# Patient Record
Sex: MALE | Race: ASIAN | NOT HISPANIC OR LATINO
[De-identification: names, ages, dates, MRNs, and addresses within clinical notes are randomized per-mention and may not be internally consistent; named-entity substitution may affect disease eponyms.]

---

## 2016-12-30 RX ORDER — FUROSEMIDE 40 MG
1 TABLET ORAL
Qty: 0 | Refills: 1 | DISCHARGE
Start: 2016-12-30 | End: 2017-02-27

## 2017-01-03 ENCOUNTER — LABORATORY RESULT (OUTPATIENT)
Age: 72
End: 2017-01-03

## 2017-01-03 PROBLEM — Z86.39 HISTORY OF HYPERCHOLESTEROLEMIA: Status: RESOLVED | Noted: 2017-01-03 | Resolved: 2017-01-03

## 2017-01-03 PROBLEM — Z87.891 FORMER SMOKER: Status: ACTIVE | Noted: 2017-01-03

## 2017-01-03 PROBLEM — Z86.79 HISTORY OF HYPERTENSION: Status: RESOLVED | Noted: 2017-01-03 | Resolved: 2017-01-03

## 2017-01-11 ENCOUNTER — NON-APPOINTMENT (OUTPATIENT)
Age: 72
End: 2017-01-11

## 2017-01-11 ENCOUNTER — APPOINTMENT (OUTPATIENT)
Dept: CARDIOLOGY | Facility: CLINIC | Age: 72
End: 2017-01-11

## 2017-01-11 VITALS
WEIGHT: 165 LBS | SYSTOLIC BLOOD PRESSURE: 120 MMHG | HEART RATE: 78 BPM | BODY MASS INDEX: 26.52 KG/M2 | OXYGEN SATURATION: 98 % | HEIGHT: 66 IN | DIASTOLIC BLOOD PRESSURE: 80 MMHG

## 2017-01-11 DIAGNOSIS — F32.9 MAJOR DEPRESSIVE DISORDER, SINGLE EPISODE, UNSPECIFIED: ICD-10-CM

## 2017-01-13 ENCOUNTER — LABORATORY RESULT (OUTPATIENT)
Age: 72
End: 2017-01-13

## 2017-01-13 LAB
ALBUMIN SERPL ELPH-MCNC: 4 G/DL
ALP BLD-CCNC: 115 U/L
ALT SERPL-CCNC: 26 U/L
ANION GAP SERPL CALC-SCNC: 16 MMOL/L
AST SERPL-CCNC: 21 U/L
BASOPHILS # BLD AUTO: 0.04 K/UL
BASOPHILS NFR BLD AUTO: 0.7 %
BILIRUB SERPL-MCNC: 1.5 MG/DL
BUN SERPL-MCNC: 35 MG/DL
CALCIUM SERPL-MCNC: 9.6 MG/DL
CHLORIDE SERPL-SCNC: 90 MMOL/L
CO2 SERPL-SCNC: 24 MMOL/L
CREAT SERPL-MCNC: 1.79 MG/DL
EOSINOPHIL # BLD AUTO: 0.22 K/UL
EOSINOPHIL NFR BLD AUTO: 4 %
GLUCOSE SERPL-MCNC: 115 MG/DL
HCT VFR BLD CALC: 36.5 %
HGB BLD-MCNC: 12 G/DL
IMM GRANULOCYTES NFR BLD AUTO: 0.2 %
INR PPP: 1.75 RATIO
LYMPHOCYTES # BLD AUTO: 1.22 K/UL
LYMPHOCYTES NFR BLD AUTO: 22.2 %
MAGNESIUM SERPL-MCNC: 2 MG/DL
MAN DIFF?: NORMAL
MCHC RBC-ENTMCNC: 29.5 PG
MCHC RBC-ENTMCNC: 32.9 GM/DL
MCV RBC AUTO: 89.7 FL
MONOCYTES # BLD AUTO: 0.39 K/UL
MONOCYTES NFR BLD AUTO: 7.1 %
NEUTROPHILS # BLD AUTO: 3.61 K/UL
NEUTROPHILS NFR BLD AUTO: 65.8 %
NT-PROBNP SERPL-MCNC: 3224 PG/ML
PLATELET # BLD AUTO: 321 K/UL
POTASSIUM SERPL-SCNC: 4.6 MMOL/L
PROT SERPL-MCNC: 7.8 G/DL
PT BLD: 19.9 SEC
RBC # BLD: 4.07 M/UL
RBC # FLD: 13.6 %
SODIUM SERPL-SCNC: 130 MMOL/L
WBC # FLD AUTO: 5.49 K/UL

## 2017-01-16 ENCOUNTER — LABORATORY RESULT (OUTPATIENT)
Age: 72
End: 2017-01-16

## 2017-01-17 ENCOUNTER — APPOINTMENT (OUTPATIENT)
Dept: CARDIOTHORACIC SURGERY | Facility: CLINIC | Age: 72
End: 2017-01-17

## 2017-01-17 VITALS
BODY MASS INDEX: 26.13 KG/M2 | RESPIRATION RATE: 16 BRPM | SYSTOLIC BLOOD PRESSURE: 134 MMHG | DIASTOLIC BLOOD PRESSURE: 68 MMHG | WEIGHT: 162.6 LBS | HEART RATE: 78 BPM | OXYGEN SATURATION: 99 % | HEIGHT: 66 IN

## 2017-01-19 ENCOUNTER — LABORATORY RESULT (OUTPATIENT)
Age: 72
End: 2017-01-19

## 2017-01-20 ENCOUNTER — CLINICAL ADVICE (OUTPATIENT)
Age: 72
End: 2017-01-20

## 2017-01-23 ENCOUNTER — LABORATORY RESULT (OUTPATIENT)
Age: 72
End: 2017-01-23

## 2017-01-24 ENCOUNTER — LABORATORY RESULT (OUTPATIENT)
Age: 72
End: 2017-01-24

## 2017-01-24 ENCOUNTER — APPOINTMENT (OUTPATIENT)
Dept: CARDIOLOGY | Facility: CLINIC | Age: 72
End: 2017-01-24

## 2017-01-25 ENCOUNTER — NON-APPOINTMENT (OUTPATIENT)
Age: 72
End: 2017-01-25

## 2017-01-25 ENCOUNTER — APPOINTMENT (OUTPATIENT)
Dept: CARDIOLOGY | Facility: CLINIC | Age: 72
End: 2017-01-25

## 2017-01-25 VITALS
WEIGHT: 164 LBS | OXYGEN SATURATION: 98 % | HEART RATE: 79 BPM | BODY MASS INDEX: 26.36 KG/M2 | SYSTOLIC BLOOD PRESSURE: 112 MMHG | HEIGHT: 66 IN | DIASTOLIC BLOOD PRESSURE: 72 MMHG

## 2017-01-25 DIAGNOSIS — K21.9 GASTRO-ESOPHAGEAL REFLUX DISEASE W/OUT ESOPHAGITIS: ICD-10-CM

## 2017-01-25 DIAGNOSIS — T81.30XA DISRUPTION OF WOUND, UNSPECIFIED, INITIAL ENCOUNTER: ICD-10-CM

## 2017-01-31 ENCOUNTER — LABORATORY RESULT (OUTPATIENT)
Age: 72
End: 2017-01-31

## 2017-02-02 ENCOUNTER — MEDICATION RENEWAL (OUTPATIENT)
Age: 72
End: 2017-02-02

## 2017-02-21 ENCOUNTER — APPOINTMENT (OUTPATIENT)
Dept: CARDIOLOGY | Facility: CLINIC | Age: 72
End: 2017-02-21

## 2017-02-21 ENCOUNTER — NON-APPOINTMENT (OUTPATIENT)
Age: 72
End: 2017-02-21

## 2017-02-21 VITALS
HEIGHT: 66 IN | DIASTOLIC BLOOD PRESSURE: 83 MMHG | WEIGHT: 165 LBS | HEART RATE: 54 BPM | SYSTOLIC BLOOD PRESSURE: 130 MMHG | OXYGEN SATURATION: 98 % | BODY MASS INDEX: 26.52 KG/M2

## 2017-02-22 LAB
ANION GAP SERPL CALC-SCNC: 11 MMOL/L
BUN SERPL-MCNC: 21 MG/DL
CALCIUM SERPL-MCNC: 9.9 MG/DL
CHLORIDE SERPL-SCNC: 103 MMOL/L
CO2 SERPL-SCNC: 23 MMOL/L
CREAT SERPL-MCNC: 1.18 MG/DL
GLUCOSE SERPL-MCNC: 195 MG/DL
INR PPP: 0.97 RATIO
NT-PROBNP SERPL-MCNC: 3861 PG/ML
POTASSIUM SERPL-SCNC: 5.1 MMOL/L
PT BLD: 11 SEC
SODIUM SERPL-SCNC: 137 MMOL/L

## 2017-03-21 ENCOUNTER — APPOINTMENT (OUTPATIENT)
Dept: CARDIOLOGY | Facility: CLINIC | Age: 72
End: 2017-03-21

## 2017-03-21 ENCOUNTER — NON-APPOINTMENT (OUTPATIENT)
Age: 72
End: 2017-03-21

## 2017-03-21 VITALS — WEIGHT: 166.5 LBS | BODY MASS INDEX: 26.87 KG/M2

## 2017-03-21 VITALS
HEART RATE: 77 BPM | BODY MASS INDEX: 27.6 KG/M2 | SYSTOLIC BLOOD PRESSURE: 137 MMHG | OXYGEN SATURATION: 98 % | DIASTOLIC BLOOD PRESSURE: 89 MMHG | WEIGHT: 171 LBS

## 2017-03-28 ENCOUNTER — LABORATORY RESULT (OUTPATIENT)
Age: 72
End: 2017-03-28

## 2017-04-05 ENCOUNTER — CHART COPY (OUTPATIENT)
Age: 72
End: 2017-04-05

## 2017-04-11 ENCOUNTER — APPOINTMENT (OUTPATIENT)
Dept: CARDIOLOGY | Facility: CLINIC | Age: 72
End: 2017-04-11

## 2017-04-18 ENCOUNTER — APPOINTMENT (OUTPATIENT)
Dept: CARDIOLOGY | Facility: CLINIC | Age: 72
End: 2017-04-18

## 2017-04-25 ENCOUNTER — APPOINTMENT (OUTPATIENT)
Dept: CARDIOLOGY | Facility: CLINIC | Age: 72
End: 2017-04-25

## 2017-04-25 VITALS — DIASTOLIC BLOOD PRESSURE: 75 MMHG | OXYGEN SATURATION: 98 % | SYSTOLIC BLOOD PRESSURE: 127 MMHG | HEART RATE: 86 BPM

## 2017-04-25 RX ORDER — LISINOPRIL 5 MG/1
5 TABLET ORAL DAILY
Qty: 30 | Refills: 0 | Status: DISCONTINUED | COMMUNITY
Start: 2017-01-25 | End: 2017-04-25

## 2017-04-25 RX ORDER — LISINOPRIL 20 MG/1
20 TABLET ORAL DAILY
Qty: 30 | Refills: 0 | Status: DISCONTINUED | COMMUNITY
Start: 2017-01-25 | End: 2017-04-25

## 2017-04-26 LAB
ANION GAP SERPL CALC-SCNC: 13 MMOL/L
BUN SERPL-MCNC: 25 MG/DL
CALCIUM SERPL-MCNC: 9.1 MG/DL
CHLORIDE SERPL-SCNC: 103 MMOL/L
CO2 SERPL-SCNC: 24 MMOL/L
CREAT SERPL-MCNC: 1.25 MG/DL
GLUCOSE SERPL-MCNC: 153 MG/DL
POTASSIUM SERPL-SCNC: 4 MMOL/L
SODIUM SERPL-SCNC: 140 MMOL/L

## 2017-05-16 ENCOUNTER — LABORATORY RESULT (OUTPATIENT)
Age: 72
End: 2017-05-16

## 2017-05-17 ENCOUNTER — APPOINTMENT (OUTPATIENT)
Dept: ELECTROPHYSIOLOGY | Facility: CLINIC | Age: 72
End: 2017-05-17

## 2017-05-23 ENCOUNTER — APPOINTMENT (OUTPATIENT)
Dept: CARDIOLOGY | Facility: CLINIC | Age: 72
End: 2017-05-23

## 2017-05-30 ENCOUNTER — RESULT CHARGE (OUTPATIENT)
Age: 72
End: 2017-05-30

## 2017-06-01 ENCOUNTER — LABORATORY RESULT (OUTPATIENT)
Age: 72
End: 2017-06-01

## 2017-06-07 ENCOUNTER — APPOINTMENT (OUTPATIENT)
Dept: ELECTROPHYSIOLOGY | Facility: CLINIC | Age: 72
End: 2017-06-07

## 2017-12-15 ENCOUNTER — NON-APPOINTMENT (OUTPATIENT)
Age: 72
End: 2017-12-15

## 2017-12-15 ENCOUNTER — APPOINTMENT (OUTPATIENT)
Dept: CARDIOLOGY | Facility: CLINIC | Age: 72
End: 2017-12-15
Payer: MEDICARE

## 2017-12-15 VITALS
SYSTOLIC BLOOD PRESSURE: 132 MMHG | OXYGEN SATURATION: 98 % | BODY MASS INDEX: 29.57 KG/M2 | DIASTOLIC BLOOD PRESSURE: 80 MMHG | WEIGHT: 183.2 LBS | HEART RATE: 88 BPM

## 2017-12-15 DIAGNOSIS — R01.2 OTHER CARDIAC SOUNDS: ICD-10-CM

## 2017-12-15 PROCEDURE — 93000 ELECTROCARDIOGRAM COMPLETE: CPT

## 2017-12-15 PROCEDURE — 99215 OFFICE O/P EST HI 40 MIN: CPT

## 2017-12-27 LAB
ALBUMIN SERPL ELPH-MCNC: 3.7 G/DL
ALP BLD-CCNC: 92 U/L
ALT SERPL-CCNC: 18 U/L
ANION GAP SERPL CALC-SCNC: 14 MMOL/L
AST SERPL-CCNC: 20 U/L
BASOPHILS # BLD AUTO: 0.03 K/UL
BASOPHILS NFR BLD AUTO: 0.6 %
BILIRUB SERPL-MCNC: 1.3 MG/DL
BUN SERPL-MCNC: 27 MG/DL
CALCIUM SERPL-MCNC: 9.6 MG/DL
CHLORIDE SERPL-SCNC: 96 MMOL/L
CO2 SERPL-SCNC: 28 MMOL/L
CREAT SERPL-MCNC: 1.47 MG/DL
EOSINOPHIL # BLD AUTO: 0.33 K/UL
EOSINOPHIL NFR BLD AUTO: 6.8 %
GLUCOSE SERPL-MCNC: 215 MG/DL
HCT VFR BLD CALC: 43.9 %
HGB BLD-MCNC: 14.1 G/DL
IMM GRANULOCYTES NFR BLD AUTO: 0.2 %
LYMPHOCYTES # BLD AUTO: 1.41 K/UL
LYMPHOCYTES NFR BLD AUTO: 28.9 %
MAGNESIUM SERPL-MCNC: 1.7 MG/DL
MAN DIFF?: NORMAL
MCHC RBC-ENTMCNC: 29.6 PG
MCHC RBC-ENTMCNC: 32.1 GM/DL
MCV RBC AUTO: 92 FL
MONOCYTES # BLD AUTO: 0.35 K/UL
MONOCYTES NFR BLD AUTO: 7.2 %
NEUTROPHILS # BLD AUTO: 2.75 K/UL
NEUTROPHILS NFR BLD AUTO: 56.3 %
NT-PROBNP SERPL-MCNC: 2155 PG/ML
PLATELET # BLD AUTO: 164 K/UL
POTASSIUM SERPL-SCNC: 3.4 MMOL/L
PROT SERPL-MCNC: 7.1 G/DL
RBC # BLD: 4.77 M/UL
RBC # FLD: 15.4 %
SODIUM SERPL-SCNC: 138 MMOL/L
WBC # FLD AUTO: 4.88 K/UL

## 2018-01-23 ENCOUNTER — APPOINTMENT (OUTPATIENT)
Dept: CARDIOLOGY | Facility: CLINIC | Age: 73
End: 2018-01-23
Payer: MEDICARE

## 2018-01-23 VITALS
OXYGEN SATURATION: 99 % | HEART RATE: 76 BPM | HEIGHT: 66 IN | WEIGHT: 169 LBS | BODY MASS INDEX: 27.16 KG/M2 | DIASTOLIC BLOOD PRESSURE: 72 MMHG | SYSTOLIC BLOOD PRESSURE: 129 MMHG

## 2018-01-23 DIAGNOSIS — E87.5 HYPERKALEMIA: ICD-10-CM

## 2018-01-23 PROCEDURE — 99215 OFFICE O/P EST HI 40 MIN: CPT

## 2018-01-27 ENCOUNTER — MEDICATION RENEWAL (OUTPATIENT)
Age: 73
End: 2018-01-27

## 2018-02-19 ENCOUNTER — TRANSCRIPTION ENCOUNTER (OUTPATIENT)
Age: 73
End: 2018-02-19

## 2018-02-22 ENCOUNTER — RX RENEWAL (OUTPATIENT)
Age: 73
End: 2018-02-22

## 2018-02-27 ENCOUNTER — RX RENEWAL (OUTPATIENT)
Age: 73
End: 2018-02-27

## 2018-03-15 ENCOUNTER — LABORATORY RESULT (OUTPATIENT)
Age: 73
End: 2018-03-15

## 2018-03-26 ENCOUNTER — RX RENEWAL (OUTPATIENT)
Age: 73
End: 2018-03-26

## 2018-03-27 ENCOUNTER — RX RENEWAL (OUTPATIENT)
Age: 73
End: 2018-03-27

## 2018-03-27 ENCOUNTER — NON-APPOINTMENT (OUTPATIENT)
Age: 73
End: 2018-03-27

## 2018-03-27 ENCOUNTER — APPOINTMENT (OUTPATIENT)
Dept: VASCULAR SURGERY | Facility: CLINIC | Age: 73
End: 2018-03-27
Payer: MEDICARE

## 2018-03-27 ENCOUNTER — APPOINTMENT (OUTPATIENT)
Dept: CARDIOLOGY | Facility: CLINIC | Age: 73
End: 2018-03-27
Payer: MEDICARE

## 2018-03-27 VITALS
RESPIRATION RATE: 16 BRPM | OXYGEN SATURATION: 99 % | HEART RATE: 67 BPM | DIASTOLIC BLOOD PRESSURE: 78 MMHG | HEIGHT: 66 IN | SYSTOLIC BLOOD PRESSURE: 116 MMHG | TEMPERATURE: 97.7 F | BODY MASS INDEX: 28.77 KG/M2 | WEIGHT: 179.03 LBS

## 2018-03-27 VITALS — OXYGEN SATURATION: 100 % | WEIGHT: 178 LBS | HEART RATE: 67 BPM | BODY MASS INDEX: 28.73 KG/M2

## 2018-03-27 VITALS — DIASTOLIC BLOOD PRESSURE: 82 MMHG | SYSTOLIC BLOOD PRESSURE: 128 MMHG

## 2018-03-27 DIAGNOSIS — Z82.49 FAMILY HISTORY OF ISCHEMIC HEART DISEASE AND OTHER DISEASES OF THE CIRCULATORY SYSTEM: ICD-10-CM

## 2018-03-27 PROCEDURE — 93000 ELECTROCARDIOGRAM COMPLETE: CPT

## 2018-03-27 PROCEDURE — 93970 EXTREMITY STUDY: CPT

## 2018-03-27 PROCEDURE — 99215 OFFICE O/P EST HI 40 MIN: CPT

## 2018-03-27 PROCEDURE — 99204 OFFICE O/P NEW MOD 45 MIN: CPT

## 2018-03-28 ENCOUNTER — MEDICATION RENEWAL (OUTPATIENT)
Age: 73
End: 2018-03-28

## 2018-03-30 PROBLEM — Z82.49 FAMILY HISTORY OF HYPERTENSION: Status: ACTIVE | Noted: 2018-03-27

## 2018-03-30 PROBLEM — Z82.49 FAMILY HISTORY OF CARDIAC DISORDER: Status: ACTIVE | Noted: 2018-03-27

## 2018-03-31 ENCOUNTER — RX RENEWAL (OUTPATIENT)
Age: 73
End: 2018-03-31

## 2018-05-01 ENCOUNTER — APPOINTMENT (OUTPATIENT)
Dept: VASCULAR SURGERY | Facility: CLINIC | Age: 73
End: 2018-05-01

## 2018-05-02 ENCOUNTER — APPOINTMENT (OUTPATIENT)
Dept: VASCULAR SURGERY | Facility: CLINIC | Age: 73
End: 2018-05-02
Payer: MEDICARE

## 2018-05-02 DIAGNOSIS — M79.89 OTHER SPECIFIED SOFT TISSUE DISORDERS: ICD-10-CM

## 2018-05-02 PROCEDURE — 36475 ENDOVENOUS RF 1ST VEIN: CPT | Mod: LT

## 2018-05-04 ENCOUNTER — APPOINTMENT (OUTPATIENT)
Dept: VASCULAR SURGERY | Facility: CLINIC | Age: 73
End: 2018-05-04
Payer: MEDICARE

## 2018-05-04 PROCEDURE — 93971 EXTREMITY STUDY: CPT

## 2018-05-06 ENCOUNTER — RX RENEWAL (OUTPATIENT)
Age: 73
End: 2018-05-06

## 2018-05-07 ENCOUNTER — MEDICATION RENEWAL (OUTPATIENT)
Age: 73
End: 2018-05-07

## 2018-05-15 ENCOUNTER — APPOINTMENT (OUTPATIENT)
Dept: VASCULAR SURGERY | Facility: CLINIC | Age: 73
End: 2018-05-15
Payer: MEDICARE

## 2018-05-15 VITALS
WEIGHT: 178 LBS | RESPIRATION RATE: 16 BRPM | BODY MASS INDEX: 28.61 KG/M2 | DIASTOLIC BLOOD PRESSURE: 69 MMHG | HEART RATE: 68 BPM | SYSTOLIC BLOOD PRESSURE: 128 MMHG | OXYGEN SATURATION: 99 % | TEMPERATURE: 97.9 F | HEIGHT: 66 IN

## 2018-05-15 DIAGNOSIS — Z78.9 OTHER SPECIFIED HEALTH STATUS: ICD-10-CM

## 2018-05-15 DIAGNOSIS — M79.605 PAIN IN LEFT LEG: ICD-10-CM

## 2018-05-15 DIAGNOSIS — Z98.890 OTHER SPECIFIED POSTPROCEDURAL STATES: ICD-10-CM

## 2018-05-15 PROCEDURE — 99214 OFFICE O/P EST MOD 30 MIN: CPT

## 2018-05-23 ENCOUNTER — APPOINTMENT (OUTPATIENT)
Dept: CARDIOLOGY | Facility: CLINIC | Age: 73
End: 2018-05-23
Payer: MEDICARE

## 2018-05-23 ENCOUNTER — NON-APPOINTMENT (OUTPATIENT)
Age: 73
End: 2018-05-23

## 2018-05-23 VITALS
HEART RATE: 64 BPM | HEIGHT: 66 IN | DIASTOLIC BLOOD PRESSURE: 77 MMHG | OXYGEN SATURATION: 98 % | SYSTOLIC BLOOD PRESSURE: 119 MMHG | WEIGHT: 176 LBS | BODY MASS INDEX: 28.28 KG/M2

## 2018-05-23 DIAGNOSIS — R73.9 HYPERGLYCEMIA, UNSPECIFIED: ICD-10-CM

## 2018-05-23 PROCEDURE — 93000 ELECTROCARDIOGRAM COMPLETE: CPT

## 2018-05-23 PROCEDURE — 99215 OFFICE O/P EST HI 40 MIN: CPT

## 2018-05-23 RX ORDER — ESCITALOPRAM OXALATE 10 MG/1
10 TABLET ORAL DAILY
Qty: 30 | Refills: 0 | Status: DISCONTINUED | COMMUNITY
Start: 2018-01-23 | End: 2018-05-23

## 2018-05-23 RX ORDER — METOLAZONE 5 MG/1
5 TABLET ORAL DAILY
Qty: 30 | Refills: 0 | Status: DISCONTINUED | COMMUNITY
Start: 2017-12-15 | End: 2018-05-23

## 2018-05-23 RX ORDER — METFORMIN HYDROCHLORIDE 500 MG/1
500 TABLET, COATED ORAL
Qty: 60 | Refills: 0 | Status: DISCONTINUED | COMMUNITY
Start: 2016-12-30 | End: 2018-05-23

## 2018-05-29 PROBLEM — Z98.890 STATUS POST ENDOVENOUS RADIOFREQUENCY ABLATION (RFA) OF SAPHENOUS VEIN: Status: ACTIVE | Noted: 2018-05-04

## 2018-05-29 PROBLEM — M79.605 LEFT LEG PAIN: Status: ACTIVE | Noted: 2018-03-27

## 2018-05-29 PROBLEM — Z78.9 NON-SMOKER: Status: ACTIVE | Noted: 2018-03-27

## 2018-06-12 ENCOUNTER — APPOINTMENT (OUTPATIENT)
Dept: CARDIOLOGY | Facility: CLINIC | Age: 73
End: 2018-06-12

## 2018-06-18 ENCOUNTER — RX RENEWAL (OUTPATIENT)
Age: 73
End: 2018-06-18

## 2018-06-27 ENCOUNTER — MEDICATION RENEWAL (OUTPATIENT)
Age: 73
End: 2018-06-27

## 2018-07-02 ENCOUNTER — APPOINTMENT (OUTPATIENT)
Dept: CARDIOLOGY | Facility: CLINIC | Age: 73
End: 2018-07-02

## 2018-07-11 ENCOUNTER — APPOINTMENT (OUTPATIENT)
Dept: CARDIOLOGY | Facility: CLINIC | Age: 73
End: 2018-07-11

## 2018-07-16 ENCOUNTER — APPOINTMENT (OUTPATIENT)
Dept: CARDIOLOGY | Facility: CLINIC | Age: 73
End: 2018-07-16
Payer: MEDICARE

## 2018-07-16 PROCEDURE — 93306 TTE W/DOPPLER COMPLETE: CPT

## 2018-07-16 PROCEDURE — 76376 3D RENDER W/INTRP POSTPROCES: CPT

## 2018-07-16 PROCEDURE — 0399T: CPT

## 2018-07-18 ENCOUNTER — APPOINTMENT (OUTPATIENT)
Dept: CARDIOLOGY | Facility: CLINIC | Age: 73
End: 2018-07-18
Payer: MEDICARE

## 2018-07-18 VITALS
BODY MASS INDEX: 29.89 KG/M2 | HEART RATE: 76 BPM | OXYGEN SATURATION: 99 % | WEIGHT: 186 LBS | DIASTOLIC BLOOD PRESSURE: 86 MMHG | SYSTOLIC BLOOD PRESSURE: 157 MMHG | HEIGHT: 66 IN

## 2018-07-18 VITALS — DIASTOLIC BLOOD PRESSURE: 90 MMHG | SYSTOLIC BLOOD PRESSURE: 155 MMHG

## 2018-07-18 DIAGNOSIS — I44.7 LEFT BUNDLE-BRANCH BLOCK, UNSPECIFIED: ICD-10-CM

## 2018-07-18 DIAGNOSIS — I42.0 DILATED CARDIOMYOPATHY: ICD-10-CM

## 2018-07-18 PROCEDURE — 99215 OFFICE O/P EST HI 40 MIN: CPT

## 2018-08-09 ENCOUNTER — MEDICATION RENEWAL (OUTPATIENT)
Age: 73
End: 2018-08-09

## 2018-08-27 ENCOUNTER — APPOINTMENT (OUTPATIENT)
Dept: ELECTROPHYSIOLOGY | Facility: CLINIC | Age: 73
End: 2018-08-27

## 2018-08-29 ENCOUNTER — APPOINTMENT (OUTPATIENT)
Dept: CARDIOLOGY | Facility: CLINIC | Age: 73
End: 2018-08-29

## 2018-10-16 ENCOUNTER — APPOINTMENT (OUTPATIENT)
Dept: CARDIOLOGY | Facility: CLINIC | Age: 73
End: 2018-10-16
Payer: MEDICARE

## 2018-10-16 ENCOUNTER — NON-APPOINTMENT (OUTPATIENT)
Age: 73
End: 2018-10-16

## 2018-10-16 VITALS
BODY MASS INDEX: 30.22 KG/M2 | OXYGEN SATURATION: 99 % | DIASTOLIC BLOOD PRESSURE: 73 MMHG | SYSTOLIC BLOOD PRESSURE: 112 MMHG | WEIGHT: 188 LBS | HEIGHT: 66 IN | HEART RATE: 76 BPM

## 2018-10-16 DIAGNOSIS — I34.0 NONRHEUMATIC MITRAL (VALVE) INSUFFICIENCY: ICD-10-CM

## 2018-10-16 PROCEDURE — 99215 OFFICE O/P EST HI 40 MIN: CPT

## 2018-10-16 PROCEDURE — 93000 ELECTROCARDIOGRAM COMPLETE: CPT

## 2018-10-26 ENCOUNTER — APPOINTMENT (OUTPATIENT)
Dept: ELECTROPHYSIOLOGY | Facility: CLINIC | Age: 73
End: 2018-10-26

## 2018-10-29 ENCOUNTER — APPOINTMENT (OUTPATIENT)
Dept: CARDIOLOGY | Facility: CLINIC | Age: 73
End: 2018-10-29
Payer: MEDICARE

## 2018-10-29 PROCEDURE — 93321 DOPPLER ECHO F-UP/LMTD STD: CPT | Mod: 59

## 2018-10-29 PROCEDURE — 93308 TTE F-UP OR LMTD: CPT

## 2018-10-29 PROCEDURE — 76376 3D RENDER W/INTRP POSTPROCES: CPT

## 2018-10-29 PROCEDURE — 0399T: CPT

## 2018-10-29 PROCEDURE — 93325 DOPPLER ECHO COLOR FLOW MAPG: CPT

## 2018-10-30 ENCOUNTER — CLINICAL ADVICE (OUTPATIENT)
Age: 73
End: 2018-10-30

## 2018-11-02 ENCOUNTER — OUTPATIENT (OUTPATIENT)
Dept: OUTPATIENT SERVICES | Facility: HOSPITAL | Age: 73
LOS: 1 days | End: 2018-11-02
Payer: MEDICARE

## 2018-11-02 VITALS
OXYGEN SATURATION: 98 % | WEIGHT: 188.94 LBS | TEMPERATURE: 97 F | DIASTOLIC BLOOD PRESSURE: 78 MMHG | SYSTOLIC BLOOD PRESSURE: 125 MMHG | RESPIRATION RATE: 18 BRPM | HEIGHT: 66 IN | HEART RATE: 67 BPM

## 2018-11-02 VITALS — WEIGHT: 188.94 LBS | HEIGHT: 66 IN

## 2018-11-02 DIAGNOSIS — I50.9 HEART FAILURE, UNSPECIFIED: Chronic | ICD-10-CM

## 2018-11-02 DIAGNOSIS — I25.10 ATHEROSCLEROTIC HEART DISEASE OF NATIVE CORONARY ARTERY WITHOUT ANGINA PECTORIS: Chronic | ICD-10-CM

## 2018-11-02 DIAGNOSIS — E78.5 HYPERLIPIDEMIA, UNSPECIFIED: Chronic | ICD-10-CM

## 2018-11-02 DIAGNOSIS — I48.91 UNSPECIFIED ATRIAL FIBRILLATION: Chronic | ICD-10-CM

## 2018-11-02 DIAGNOSIS — I10 ESSENTIAL (PRIMARY) HYPERTENSION: Chronic | ICD-10-CM

## 2018-11-02 DIAGNOSIS — Z01.810 ENCOUNTER FOR PREPROCEDURAL CARDIOVASCULAR EXAMINATION: ICD-10-CM

## 2018-11-02 DIAGNOSIS — I34.0 NONRHEUMATIC MITRAL (VALVE) INSUFFICIENCY: Chronic | ICD-10-CM

## 2018-11-02 DIAGNOSIS — Z95.1 PRESENCE OF AORTOCORONARY BYPASS GRAFT: Chronic | ICD-10-CM

## 2018-11-02 DIAGNOSIS — I25.5 ISCHEMIC CARDIOMYOPATHY: Chronic | ICD-10-CM

## 2018-11-02 DIAGNOSIS — Z98.890 OTHER SPECIFIED POSTPROCEDURAL STATES: Chronic | ICD-10-CM

## 2018-11-02 DIAGNOSIS — K21.9 GASTRO-ESOPHAGEAL REFLUX DISEASE WITHOUT ESOPHAGITIS: Chronic | ICD-10-CM

## 2018-11-02 DIAGNOSIS — I44.7 LEFT BUNDLE-BRANCH BLOCK, UNSPECIFIED: Chronic | ICD-10-CM

## 2018-11-02 DIAGNOSIS — E11.9 TYPE 2 DIABETES MELLITUS WITHOUT COMPLICATIONS: Chronic | ICD-10-CM

## 2018-11-02 LAB
ANION GAP SERPL CALC-SCNC: 11 MMOL/L — SIGNIFICANT CHANGE UP (ref 5–17)
APTT BLD: 32 SEC — SIGNIFICANT CHANGE UP (ref 27.5–36.3)
BASOPHILS # BLD AUTO: 0 K/UL — SIGNIFICANT CHANGE UP (ref 0–0.2)
BASOPHILS NFR BLD AUTO: 0.6 % — SIGNIFICANT CHANGE UP (ref 0–2)
BUN SERPL-MCNC: 34 MG/DL — HIGH (ref 8–20)
CALCIUM SERPL-MCNC: 8.8 MG/DL — SIGNIFICANT CHANGE UP (ref 8.6–10.2)
CHLORIDE SERPL-SCNC: 103 MMOL/L — SIGNIFICANT CHANGE UP (ref 98–107)
CO2 SERPL-SCNC: 22 MMOL/L — SIGNIFICANT CHANGE UP (ref 22–29)
CREAT SERPL-MCNC: 1.39 MG/DL — HIGH (ref 0.5–1.3)
EOSINOPHIL # BLD AUTO: 0.3 K/UL — SIGNIFICANT CHANGE UP (ref 0–0.5)
EOSINOPHIL NFR BLD AUTO: 6.4 % — HIGH (ref 0–5)
GLUCOSE SERPL-MCNC: 164 MG/DL — HIGH (ref 70–115)
HCT VFR BLD CALC: 36.9 % — LOW (ref 42–52)
HGB BLD-MCNC: 12.1 G/DL — LOW (ref 14–18)
INR BLD: 1.01 RATIO — SIGNIFICANT CHANGE UP (ref 0.88–1.16)
LYMPHOCYTES # BLD AUTO: 1.4 K/UL — SIGNIFICANT CHANGE UP (ref 1–4.8)
LYMPHOCYTES # BLD AUTO: 30.8 % — SIGNIFICANT CHANGE UP (ref 20–55)
MAGNESIUM SERPL-MCNC: 2 MG/DL — SIGNIFICANT CHANGE UP (ref 1.6–2.6)
MCHC RBC-ENTMCNC: 31.6 PG — HIGH (ref 27–31)
MCHC RBC-ENTMCNC: 32.8 G/DL — SIGNIFICANT CHANGE UP (ref 32–36)
MCV RBC AUTO: 96.3 FL — HIGH (ref 80–94)
MONOCYTES # BLD AUTO: 0.2 K/UL — SIGNIFICANT CHANGE UP (ref 0–0.8)
MONOCYTES NFR BLD AUTO: 5.3 % — SIGNIFICANT CHANGE UP (ref 3–10)
NEUTROPHILS # BLD AUTO: 2.7 K/UL — SIGNIFICANT CHANGE UP (ref 1.8–8)
NEUTROPHILS NFR BLD AUTO: 56.5 % — SIGNIFICANT CHANGE UP (ref 37–73)
PLATELET # BLD AUTO: 156 K/UL — SIGNIFICANT CHANGE UP (ref 150–400)
POTASSIUM SERPL-MCNC: 4.4 MMOL/L — SIGNIFICANT CHANGE UP (ref 3.5–5.3)
POTASSIUM SERPL-SCNC: 4.4 MMOL/L — SIGNIFICANT CHANGE UP (ref 3.5–5.3)
PROTHROM AB SERPL-ACNC: 11.6 SEC — SIGNIFICANT CHANGE UP (ref 10–12.9)
RBC # BLD: 3.83 M/UL — LOW (ref 4.6–6.2)
RBC # FLD: 13 % — SIGNIFICANT CHANGE UP (ref 11–15.6)
SODIUM SERPL-SCNC: 136 MMOL/L — SIGNIFICANT CHANGE UP (ref 135–145)
WBC # BLD: 4.7 K/UL — LOW (ref 4.8–10.8)
WBC # FLD AUTO: 4.7 K/UL — LOW (ref 4.8–10.8)

## 2018-11-02 PROCEDURE — 80048 BASIC METABOLIC PNL TOTAL CA: CPT

## 2018-11-02 PROCEDURE — 85610 PROTHROMBIN TIME: CPT

## 2018-11-02 PROCEDURE — 85730 THROMBOPLASTIN TIME PARTIAL: CPT

## 2018-11-02 PROCEDURE — 85027 COMPLETE CBC AUTOMATED: CPT

## 2018-11-02 PROCEDURE — 93005 ELECTROCARDIOGRAM TRACING: CPT

## 2018-11-02 PROCEDURE — 93010 ELECTROCARDIOGRAM REPORT: CPT

## 2018-11-02 PROCEDURE — G0463: CPT

## 2018-11-02 PROCEDURE — 83735 ASSAY OF MAGNESIUM: CPT

## 2018-11-02 PROCEDURE — 36415 COLL VENOUS BLD VENIPUNCTURE: CPT

## 2018-11-02 RX ORDER — APIXABAN 2.5 MG/1
1 TABLET, FILM COATED ORAL
Qty: 0 | Refills: 0 | COMMUNITY

## 2018-11-02 NOTE — ASU PATIENT PROFILE, ADULT - PMH
AF (atrial fibrillation)    CAD (coronary artery disease)    Cardiomyopathy    CHF (congestive heart failure)  chronic systolic congestive heart failure  DM (diabetes mellitus)    Ejection fraction < 50%  as per  echo  10-29-18   ef<20%  HTN (hypertension)    LBBB (left bundle branch block)    MR (mitral regurgitation)

## 2018-11-02 NOTE — H&P PST ADULT - ASSESSMENT
This is a 72 yo Slov[place] male with PMHx of HTN, DM, CAD sp 3VCABG, Mitral valve repair, MARY LOU clip, ICM EF 20%, NYHA class 3 HF, and newly diagnosed Afib.  Patient is sp life vest.  Plan for this patient is RANDOLPH to evaluate leak and plan for CRT-D. Patient was started on Eliquis 10/16/2018.    Plan RANDOLPH with possible CV 11/7/2018  Patient to be npo p mn 11/6/2018  Eliquis 5 mg BID D/W Dr Juve liu procedure  Hold Metformin and glimipiride 11/7/2018 am This is a 72 yo Slov[place] male with PMHx of HTN, DM, CAD sp 3VCABG, Mitral valve repair, MARY LOU clip, ICM EF 20%, NYHA class 3 HF, and newly diagnosed Afib.  Patient is sp life vest.  Plan for this patient is RANDOLPH to evaluate leak and plan for CRT-D. Patient was started on Eliquis 10/16/2018.    GFR 50    Plan RANDOLPH with possible CV 11/7/2018  Patient to be npo p mn 11/6/2018  Eliquis 5 mg BID D/W Dr Juve liu procedure  Hold Metformin and glimipiride 11/7/2018 am

## 2018-11-02 NOTE — H&P PST ADULT - NEGATIVE NEUROLOGICAL SYMPTOMS
no syncope/no focal seizures/no vertigo/no loss of sensation/no transient paralysis/no difficulty walking/no weakness/no paresthesias/no generalized seizures

## 2018-11-02 NOTE — H&P PST ADULT - HISTORY OF PRESENT ILLNESS
This is a 70 yo Maced[place] male with PMHx of HTN, DM, CAD sp 3VCABG, Mitral valve repair, MARY LOU clip, ICM EF 20%, NYHA class 3 HF, and newly diagnosed Afib.  Patient is sp life vest.  Plan for this patient is RANDOLPH to evaluate leak and plan for CRT-D. Patient was started on Eliquis 10/16/2018.    Echo 10/29/2018: Moderately enlarged LA, Segmental wall motion abnormalities in multivessel distribution - worse in RCA territory.  LVEF 16%, mildly dilated RA, RV sixe is mildly enlarged and function is mildly reduced, mild aortic regurgitation, S/P mitral annular ring , mild mitral valve regurgitation.      Cardiac Catheterization 12/14/2018:  < from: Cardiac Cath Lab - Adult (12.14.16 @ 16:48) >  VENTRICLES: No LV gram was performed; however, a recent echocardiogram  demonstrated an EF of 20 %.  CORONARY VESSELS: The coronary circulation is right dominant.  LM:   --  LM: Angiography showed minor luminal irregularities with no flow  limiting lesions.  LAD:   --  LAD: There was a 80 % stenosis in the proximal third of the  vessel segment.  --  Mid LAD: There was a 80 % stenosis.  --  Distal LAD: There was a 70 % stenosis.  CX:   --  Circumflex: Angiography showed mild atherosclerosis with no flow  limiting lesions.  --  OM1: There was a 70 % stenosis in the middle third of the vessel  segment.  RI:   --  Ramus intermedius: There was a 90 % stenosis in the middle third  of the vessel segment.  RCA:   --  Distal RCA: There was a 70 % stenosis.  COMPLICATIONS: No complications occurred during the cath lab visit.  DIAGNOSTIC IMPRESSIONS: Multi-vessel disease including long diffuse LAD  disease in the setting of severe cardiomyopathy. Mildly elevated LVEDP.  DIAGNOSTIC RECOMMENDATIONS: CT surgery evaluation for possible CABG.  INTERVENTIONAL IMPRESSIONS: Multi-vessel disease including long diffuse LAD  disease in the setting of severe cardiomyopathy. Mildly elevated LVEDP.  INTERVENTIONAL RECOMMENDATIONS: CT surgery evaluation for possible CABG.  Prepared and signed by  Dada Lozano DO  Signed 12/14/2016 17:57:22    < end of copied text >

## 2018-11-06 ENCOUNTER — FORM ENCOUNTER (OUTPATIENT)
Age: 73
End: 2018-11-06

## 2018-11-07 ENCOUNTER — TRANSCRIPTION ENCOUNTER (OUTPATIENT)
Age: 73
End: 2018-11-07

## 2018-11-07 ENCOUNTER — OUTPATIENT (OUTPATIENT)
Dept: OUTPATIENT SERVICES | Facility: HOSPITAL | Age: 73
LOS: 1 days | End: 2018-11-07
Payer: MEDICARE

## 2018-11-07 VITALS
RESPIRATION RATE: 18 BRPM | TEMPERATURE: 97 F | SYSTOLIC BLOOD PRESSURE: 149 MMHG | OXYGEN SATURATION: 98 % | HEART RATE: 89 BPM | DIASTOLIC BLOOD PRESSURE: 97 MMHG

## 2018-11-07 VITALS — WEIGHT: 188.94 LBS

## 2018-11-07 DIAGNOSIS — Z98.890 OTHER SPECIFIED POSTPROCEDURAL STATES: Chronic | ICD-10-CM

## 2018-11-07 DIAGNOSIS — Z95.1 PRESENCE OF AORTOCORONARY BYPASS GRAFT: Chronic | ICD-10-CM

## 2018-11-07 DIAGNOSIS — I48.91 UNSPECIFIED ATRIAL FIBRILLATION: ICD-10-CM

## 2018-11-07 DIAGNOSIS — Z01.810 ENCOUNTER FOR PREPROCEDURAL CARDIOVASCULAR EXAMINATION: ICD-10-CM

## 2018-11-07 PROCEDURE — 93010 ELECTROCARDIOGRAM REPORT: CPT

## 2018-11-07 PROCEDURE — 93320 DOPPLER ECHO COMPLETE: CPT | Mod: 26

## 2018-11-07 PROCEDURE — 93320 DOPPLER ECHO COMPLETE: CPT

## 2018-11-07 PROCEDURE — 93325 DOPPLER ECHO COLOR FLOW MAPG: CPT | Mod: 26

## 2018-11-07 PROCEDURE — 92960 CARDIOVERSION ELECTRIC EXT: CPT

## 2018-11-07 PROCEDURE — 93325 DOPPLER ECHO COLOR FLOW MAPG: CPT

## 2018-11-07 PROCEDURE — 93312 ECHO TRANSESOPHAGEAL: CPT

## 2018-11-07 PROCEDURE — 93312 ECHO TRANSESOPHAGEAL: CPT | Mod: 26

## 2018-11-07 PROCEDURE — 93005 ELECTROCARDIOGRAM TRACING: CPT

## 2018-11-07 PROCEDURE — 76376 3D RENDER W/INTRP POSTPROCES: CPT | Mod: 26

## 2018-11-07 RX ORDER — SODIUM CHLORIDE 9 MG/ML
1000 INJECTION INTRAMUSCULAR; INTRAVENOUS; SUBCUTANEOUS
Qty: 0 | Refills: 0 | Status: DISCONTINUED | OUTPATIENT
Start: 2018-11-07 | End: 2018-11-22

## 2018-11-07 RX ORDER — AMIODARONE HYDROCHLORIDE 400 MG/1
200 TABLET ORAL DAILY
Qty: 0 | Refills: 0 | Status: DISCONTINUED | OUTPATIENT
Start: 2018-11-14 | End: 2018-11-22

## 2018-11-07 RX ORDER — AMIODARONE HYDROCHLORIDE 400 MG/1
2 TABLET ORAL
Qty: 28 | Refills: 0 | OUTPATIENT
Start: 2018-11-07 | End: 2018-11-13

## 2018-11-07 RX ORDER — AMIODARONE HYDROCHLORIDE 400 MG/1
400 TABLET ORAL DAILY
Qty: 0 | Refills: 0 | Status: DISCONTINUED | OUTPATIENT
Start: 2018-11-07 | End: 2018-11-07

## 2018-11-07 RX ORDER — SACUBITRIL AND VALSARTAN 24; 26 MG/1; MG/1
1.5 TABLET, FILM COATED ORAL
Qty: 90 | Refills: 0
Start: 2018-11-07 | End: 2018-12-06

## 2018-11-07 RX ORDER — AMIODARONE HYDROCHLORIDE 400 MG/1
400 TABLET ORAL EVERY 12 HOURS
Qty: 0 | Refills: 0 | Status: DISCONTINUED | OUTPATIENT
Start: 2018-11-07 | End: 2018-11-22

## 2018-11-07 RX ORDER — SACUBITRIL AND VALSARTAN 24; 26 MG/1; MG/1
1 TABLET, FILM COATED ORAL
Qty: 0 | Refills: 0 | COMMUNITY

## 2018-11-07 RX ORDER — AMIODARONE HYDROCHLORIDE 400 MG/1
1 TABLET ORAL
Qty: 30 | Refills: 0 | OUTPATIENT
Start: 2018-11-07 | End: 2018-12-06

## 2018-11-07 NOTE — DISCHARGE NOTE ADULT - COMPLETE THE FOLLOWING IF THE PATIENT REFUSES THE INFLUENZA VACCINE:
PRELIMINARY REPORT/[facility]/EMERGENCY AFTER

HOURS PROCEDURE:

 

EXAM:

CT Angiography Chest With Intravenous Contrast

 

CLINICAL HISTORY:

38 years old, female; Signs and symptoms; Other: Rt. Arm/hand numbness; Other: R/O disection; Patien
t HX: Rt. Sided numbness to hand/arm R/O aortic dissection

 

TECHNIQUE:

Axial computed tomographic angiography images of the chest with intravenous contrast using pulmonary
 embolism protocol. This CT exam was performed using one or more of the following dose reduction nancy
hniques: automated exposure control, adjustment of the mA and/or kV according to patient size, and/o
r use of iterative reconstruction technique.

Coronal and sagittal reformatted images were created and reviewed.

 

CONTRAST:

95 mL of  administered intravenously.

 

COMPARISON:

No relevant prior studies available.

 

FINDINGS:

Pulmonary arteries: There is no evidence of peripheral filling defects within the pulmonary arterial
 circulation to suggest pulmonary embolism. The pulmonary arteries are not enlarged.

Aorta: There is no evidence of aortic dissection, leak, rupture, or other complications. The aorta i
s normal.

Lungs: Normal. No mass. No consolidation.

Pleural space: Normal. No significant effusion. No pneumothorax.

Heart: The cardiac structures are normal. No significant pericardial effusion. No evidence of RV dys
function.

Mediastinum: The trachea is normal.

Thyroid: The visualized thyroid gland is unremarkable.

Bones/joints: No acute fracture. No dislocation.

Soft tissues: Normal.

Lymph nodes: Normal. No enlarged lymph nodes.

 

IMPRESSION:

There is no evidence of aortic dissection, leak, rupture, or other complications.

_______________________________________________

EXAM:

CT Angiography Abdomen With Intravenous Contrast

 

CLINICAL HISTORY:

38 years old, female; Signs and symptoms; Other: Rt. Arm/hand numbness; Other: R/O disection; Patien
t HX: Rt. Sided numbness to hand/arm R/O aortic dissection

 

TECHNIQUE:

Axial computed tomographic angiography images of the abdomen with intravenous contrast. This CT exam
 was performed using one or more of the following dose reduction techniques: automated exposure cont
rol, adjustment of the mA and/or kV according to patient size, and/or use of iterative reconstructio
n technique.

Coronal and sagittal reformatted images were created and reviewed.

 

CONTRAST:

95 mL of  administered intravenously.

 

EXAM DATE/TIME:

Exam ordered 7/5/2017 11:52 PM

 

COMPARISON:

No relevant prior studies available.

 

FINDINGS:

Lower thorax: No acute findings.

Aorta: There is no evidence of aortic dissection, leak, rupture, or other complications.

Celiac trunk and mesenteric arteries: No acute findings. No occlusion or significant stenosis.

Renal arteries: No acute findings. No occlusion or significant stenosis.

Liver: There are no focal liver lesions present.

Gallbladder and bile ducts: There has been a cholecystectomy. No ductal dilation.

Pancreas: The pancreas is normal. No ductal dilation.

Spleen: The spleen is normal.

Adrenals: The adrenal glands are normal.

Kidneys and ureters: The kidneys are normal. No hydronephrosis.

Stomach and bowel: The stomach is normal. The duodenum is unremarkable. No obstruction. No mucosal t
hickening.

Intraperitoneal space: Normal. No significant fluid collection. No free air.

Bones/joints: No acute fracture. No dislocation.

Soft tissues: Normal. No mass.

Lymph nodes: Normal. No enlarged lymph nodes.

Other findings: Partially visualized uterus is unremarkable.

 

IMPRESSION:

No acute findings.

 

Thank you for allowing us to participate in the care of your patient.

 

Dictated and Authenticated by: Alberto Kidd MD

07/06/2017 12:58 AM Central Time ([place] \T\ [place])

 

 

FINAL REPORT

CT AORTIC DISSECTION WITH CONTRAST:

 

Date:  07/06/17 

 

Spiral CT of the thorax and abdomen was done to evaluate the patient for a possible aortic dissectio
n. Axial slices were acquired after giving a bolus of IV contrast. Coronal and sagittal reconstructi
ons were done. 

 

FINDINGS:

 

CT ANGIO THORAX:

The aorta appears normal. There is no sign of significant arteriosclerosis. No dissection, aneurysm,
 or other finding of concern was encountered. The pulmonary arteries fill well and appear normal. Th
ere were no filling defects to suggest emboli. The lungs are clear. No infiltrate, nodule, or effusi
on was seen. The thoracic vertebra were unremarkable. 

 

CT ANGIO ABDOMEN:

The abdominal aorta also appears normal. There was no aneurysm or dissection. The celiac artery, SMA
, and YURY all fill appropriately. No arterial abnormalities were appreciated. 

 

All major organs appear intact. The liver, spleen, pancreas, kidneys, and adrenal glands all showed 
no acute findings. There may be a tiny subcentimeter cyst in the left kidney. A few bright areas in 
the kidneys are difficult to tell if they are calculi, opacified vessels, or collecting systems. A n
oncontrast study would better show this. 

 

The bowel was unremarkable. There was no sign of bowel dilation or thickening. The upper pelvis is i
ncluded on the study and showed no abnormality. There is some mild concentric bulging of the L4-L5 d
isc. 

 

IMPRESSION: 

No evidence for aortic dissection or other acute change. 

 

Report in agreement with preliminary reading by Salma. 

 

 

POS: HOME Risks/benefits discussed with patient/surrogate

## 2018-11-07 NOTE — DISCHARGE NOTE ADULT - CONDITIONS AT DISCHARGE
iv removed intact , dressing applied . vitals stable. discharge instructions given daughter at bedside for diacharge . pt tolerated po water , juice and yougurt well.

## 2018-11-07 NOTE — DISCHARGE NOTE ADULT - PATIENT PORTAL LINK FT
You can access the [facility], offered by [facility], by registering with the following website: [email or web address]

## 2018-11-07 NOTE — DISCHARGE NOTE ADULT - FINDINGS/TREATMENT
NB: Received call from pharmacy that pt is on Lexapro (not on med rec)  QTc is 505 mscec  post cardioversion  D/W Dr. An.  Will change amiodarone to 200mg po daily [facility]  FU Dr. An 1-2 weeks

## 2018-11-07 NOTE — DISCHARGE NOTE ADULT - HOSPITAL COURSE
s/p RANDOLPH no thrombus no flow to appendage  CV 250J to SR  12 ECG done LBBB noted. Prior ECG also LBBB upon evaluation   IV lasix given for dilated SVC   Above per verbal report by Dr. An  Tolerated procedure well w/o complications  Seen post procedure by Dr. An  RXs esubmitted to [facility] for amiodarone/entresto and d/w pt  Called [facility] to confirm that pt had not previously been on amiodarone  +gag +swallow Patent airway  NEURO: CN II-XII intact s/p RANDOLPH no thrombus no flow to appendage  CV 250J to SR  12 ECG done LBBB noted. Prior ECG also LBBB upon evaluation   IV lasix given for dilated SVC Depressed EF per prior [facility] report  Above per verbal report by Dr. An  Tolerated procedure well w/o complications  Seen post procedure by Dr. An  RXs esubmitted to [facility] for amiodarone/entresto and d/w pt  Called [facility] to confirm that pt had not previously been on amiodarone  +gag +swallow Patent airway  NEURO: CN II-XII intact

## 2018-11-07 NOTE — DISCHARGE NOTE ADULT - CARE PROVIDER_API CALL
Zac An), Cardiology; Internal Medicine  [address] [id number]  Phone: [phone]  Fax: [phone]

## 2018-11-07 NOTE — DISCHARGE NOTE ADULT - CARE PLAN
Principal Discharge DX:	AF (atrial fibrillation)  Goal:	NSR  Assessment and plan of treatment:	ZORAIDA Huston 1-2 weeks  Begin amiodarone as instructed [facility]  Increase Entresto to 1 1/2 tablets 2x a day [facility]

## 2018-11-07 NOTE — DISCHARGE NOTE ADULT - PLAN OF CARE
NSR ZORAIDA Huston 1-2 weeks  Begin amiodarone as instructed [facility]  Increase Entresto to 1 1/2 tablets 2x a day [facility]

## 2018-11-07 NOTE — PROCEDURE NOTE - ADDITIONAL PROCEDURE DETAILS
After risks and benefits of procedures were explained informed consent was obtained and placed in chart.  Anesthesia was present and administered sedation.  Patient then received synchronized cardioversion x1 with 250J.  Patient converted to sinus rhythm, confirmed by 12-lead EKG.  Patient tolerated the procedure well without complication.

## 2018-11-07 NOTE — DISCHARGE NOTE ADULT - MEDICATION SUMMARY - MEDICATIONS TO TAKE
I will START or STAY ON the medications listed below when I get home from the hospital:    spironolactone 50 mg oral tablet  -- 1 tab(s) by mouth once a day  -- Indication: For heart failure    aspirin 81 mg oral delayed release tablet  -- 1 tab(s) by mouth once a day  -- Indication: For cad    Entresto 49 mg-51 mg oral tablet  -- 1.5 tab(s) by mouth every 12 hours   -- Indication: For heart failure    amiodarone 200 mg oral tablet  -- 2 tab(s) by mouth every 12 hours MDD:800  -- Indication: For Antiarrythmic    Pacerone 200 mg oral tablet  -- 1 tab(s) by mouth once a day  -- Indication: For Antiarrythmic    digoxin 125 mcg (0.125 mg) oral tablet  -- 1 tab(s) by mouth once a day  -- Indication: For Atrial fibrillation    Eliquis 5 mg oral tablet  -- 1 tab(s) by mouth 2 times a day  -- Indication: For Atrial fibrillation    metFORMIN 1000 mg oral tablet  -- 1 tab(s) by mouth 2 times a day  -- Indication: For dm    glimepiride 1 mg oral tablet  -- 1 tab(s) by mouth once a day  -- Indication: For dm    atorvastatin 20 mg oral tablet  -- 1 tab(s) by mouth once a day  -- Indication: For hld    carvedilol 12.5 mg oral tablet  -- 1 tab(s) by mouth 2 times a day  -- Indication: For heart failure    furosemide 40 mg oral tablet  -- 1 tab(s) by mouth once a day  -- Indication: For diuretic

## 2018-11-08 RX ORDER — AMIODARONE HYDROCHLORIDE 400 MG/1
1 TABLET ORAL
Qty: 0 | Refills: 0 | COMMUNITY
Start: 2018-11-08

## 2018-11-13 PROBLEM — I42.9 CARDIOMYOPATHY, UNSPECIFIED: Chronic | Status: ACTIVE | Noted: 2018-11-02

## 2018-11-13 PROBLEM — I25.10 ATHEROSCLEROTIC HEART DISEASE OF NATIVE CORONARY ARTERY WITHOUT ANGINA PECTORIS: Chronic | Status: ACTIVE | Noted: 2018-11-02

## 2018-11-13 PROBLEM — I34.0 NONRHEUMATIC MITRAL (VALVE) INSUFFICIENCY: Chronic | Status: ACTIVE | Noted: 2018-11-02

## 2018-11-13 PROBLEM — I50.9 HEART FAILURE, UNSPECIFIED: Chronic | Status: ACTIVE | Noted: 2018-11-02

## 2018-11-13 PROBLEM — I44.7 LEFT BUNDLE-BRANCH BLOCK, UNSPECIFIED: Chronic | Status: ACTIVE | Noted: 2018-11-02

## 2018-11-13 PROBLEM — E11.9 TYPE 2 DIABETES MELLITUS WITHOUT COMPLICATIONS: Chronic | Status: ACTIVE | Noted: 2018-11-02

## 2018-11-13 PROBLEM — R94.30 ABNORMAL RESULT OF CARDIOVASCULAR FUNCTION STUDY, UNSPECIFIED: Chronic | Status: ACTIVE | Noted: 2018-11-02

## 2018-11-13 PROBLEM — I48.91 UNSPECIFIED ATRIAL FIBRILLATION: Chronic | Status: ACTIVE | Noted: 2018-11-02

## 2018-11-14 RX ORDER — AMIODARONE HYDROCHLORIDE 400 MG/1
1 TABLET ORAL
Qty: 90 | Refills: 0
Start: 2018-11-14 | End: 2019-02-11

## 2018-11-21 ENCOUNTER — MOBILE ON CALL (OUTPATIENT)
Age: 73
End: 2018-11-21

## 2018-11-23 ENCOUNTER — APPOINTMENT (OUTPATIENT)
Dept: ELECTROPHYSIOLOGY | Facility: CLINIC | Age: 73
End: 2018-11-23

## 2018-11-23 NOTE — CARDIOLOGY SUMMARY
[___] : [unfilled] [LVEF ___%] : LVEF [unfilled]% [Severe] : severe LV dysfunction [Enlarged] : enlarged LA size [Moderate] : moderate mitral regurgitation

## 2018-11-23 NOTE — REVIEW OF SYSTEMS
[Fever] : no fever [Chills] : no chills [Blurry Vision] : no blurred vision [Mouth Sores] : no mouth sores [see HPI] : see HPI [Cough] : no cough [Wheezing] : no wheezing [Coughing Up Blood] : no hemoptysis [Abdominal Pain] : no abdominal pain [Nausea] : no nausea [Vomiting] : no vomiting [Urinary Frequency] : no change in urinary frequency [Impotence] : no impotence [Joint Pain] : no joint pain [Muscle Cramps] : no muscle cramps [Skin: A Rash] : no rash: [Skin Lesions] : no skin lesions [Dizziness] : no dizziness [Convulsions] : no convulsions [Confusion] : no confusion was observed [Anxiety] : no anxiety [Excessive Thirst] : no polydipsia [Easy Bleeding] : no tendency for easy bleeding [Easy Bruising] : no tendency for easy bruising

## 2018-11-23 NOTE — DISCUSSION/SUMMARY
[FreeTextEntry1] : 72 yo Portuguese with a h/o NYHA Class III ICM (EF 20-25%) s/p 3v-CABG/MVR/MARY LOU clip, persistent AF s/p DCCV, and NIDDM who is found to have a RBBB on EKG w/QRS = \par \par The patient has been on GDOMT for >3 months and despite revascularization remains with severe biventricular dysfunction. He has a Class I indication for implantation of an ICD for primary prevention from SCD. \par \par However, although he does have e/o ventricular dyssynchrony, it is of a RBBB variety, and coupled with a h/o AF, the fact that he's a male, and has an ischemic (vs. nonischemic) etiology for his underlying ventricular dysfunction, these are all negative predictors of response to CRT therapy. \par \par Regarding his AF, he remains in NSR today on OAC. Will not add any AADs to his regimen at this time. After device implantation, will be able to follow remotely for true AF burden where AAD vs. RF ablation can then be considered.\par \par Therefore, recommendations include:\par 1. Schedule single chamber ICD implantation for primary prevention with [employer]\par 2. Hold OAC the night prior to procedure\par 3. F/u for routine device/wound check 2 weeks post-procedure

## 2018-11-23 NOTE — PHYSICAL EXAM
[General Appearance - Well Developed] : well developed [Normal Appearance] : normal appearance [Well Groomed] : well groomed [General Appearance - Well Nourished] : well nourished [No Deformities] : no deformities [General Appearance - In No Acute Distress] : no acute distress [Normal Conjunctiva] : the conjunctiva exhibited no abnormalities [Normal Oral Mucosa] : normal oral mucosa [Heart Rate And Rhythm] : heart rate and rhythm were normal [Heart Sounds] : normal S1 and S2 [Arterial Pulses Normal] : the arterial pulses were normal [Edema] : no peripheral edema present [Respiration, Rhythm And Depth] : normal respiratory rhythm and effort [Auscultation Breath Sounds / Voice Sounds] : lungs were clear to auscultation bilaterally [Abdomen Soft] : soft [Abdomen Tenderness] : non-tender [Abnormal Walk] : normal gait [Nail Clubbing] : no clubbing of the fingernails [Cyanosis, Localized] : no localized cyanosis [Skin Color & Pigmentation] : normal skin color and pigmentation [Skin Turgor] : normal skin turgor [] : no rash [Oriented To Time, Place, And Person] : oriented to person, place, and time [Impaired Insight] : insight and judgment were intact [Affect] : the affect was normal [Mood] : the mood was normal [No Anxiety] : not feeling anxious

## 2018-11-23 NOTE — HISTORY OF PRESENT ILLNESS
[FreeTextEntry1] : Mr. Trinidad is a pleasant 70 yo [place]ian with a h/o NYHA Class III ICM (EF 20-25%) s/p 3v-CABG/MVR/MARY LOU clip, persistent AF s/p recent DCCV, and NIDDM who presents in electrophysiology consultation for consideration of CRT-D therapy. \par \par

## 2018-11-23 NOTE — REASON FOR VISIT
[Consultation] : a consultation regarding [Atrial Fibrillation] : atrial fibrillation [FreeTextEntry1] : Referral: Dr. An

## 2018-11-30 ENCOUNTER — RX RENEWAL (OUTPATIENT)
Age: 73
End: 2018-11-30

## 2018-12-03 ENCOUNTER — RX RENEWAL (OUTPATIENT)
Age: 73
End: 2018-12-03

## 2018-12-03 RX ORDER — CARVEDILOL 3.12 MG/1
3.12 TABLET, FILM COATED ORAL
Qty: 60 | Refills: 0 | Status: DISCONTINUED | COMMUNITY
Start: 2017-07-03 | End: 2018-12-03

## 2018-12-06 ENCOUNTER — RX RENEWAL (OUTPATIENT)
Age: 73
End: 2018-12-06

## 2019-01-18 ENCOUNTER — RX RENEWAL (OUTPATIENT)
Age: 74
End: 2019-01-18

## 2019-03-07 ENCOUNTER — TRANSCRIPTION ENCOUNTER (OUTPATIENT)
Age: 74
End: 2019-03-07

## 2019-03-22 ENCOUNTER — RX RENEWAL (OUTPATIENT)
Age: 74
End: 2019-03-22

## 2019-11-19 ENCOUNTER — INPATIENT (INPATIENT)
Facility: HOSPITAL | Age: 74
LOS: 6 days | Discharge: HOME | DRG: 292 | End: 2019-11-26
Attending: INTERNAL MEDICINE | Admitting: EMERGENCY MEDICINE
Payer: MEDICARE

## 2019-11-19 VITALS
RESPIRATION RATE: 18 BRPM | DIASTOLIC BLOOD PRESSURE: 83 MMHG | TEMPERATURE: 98 F | HEART RATE: 70 BPM | SYSTOLIC BLOOD PRESSURE: 138 MMHG | OXYGEN SATURATION: 99 % | WEIGHT: 169.98 LBS | HEIGHT: 66 IN

## 2019-11-19 DIAGNOSIS — Z98.890 OTHER SPECIFIED POSTPROCEDURAL STATES: Chronic | ICD-10-CM

## 2019-11-19 DIAGNOSIS — E87.70 FLUID OVERLOAD, UNSPECIFIED: ICD-10-CM

## 2019-11-19 DIAGNOSIS — Z95.1 PRESENCE OF AORTOCORONARY BYPASS GRAFT: Chronic | ICD-10-CM

## 2019-11-19 LAB
ALBUMIN SERPL ELPH-MCNC: 4 G/DL — SIGNIFICANT CHANGE UP (ref 3.3–5.2)
ALP SERPL-CCNC: 109 U/L — SIGNIFICANT CHANGE UP (ref 40–120)
ALT FLD-CCNC: 15 U/L — SIGNIFICANT CHANGE UP
ANION GAP SERPL CALC-SCNC: 13 MMOL/L — SIGNIFICANT CHANGE UP (ref 5–17)
APAP SERPL-MCNC: <7.5 UG/ML — LOW (ref 10–26)
APTT BLD: 33.3 SEC — SIGNIFICANT CHANGE UP (ref 27.5–36.3)
AST SERPL-CCNC: 26 U/L — SIGNIFICANT CHANGE UP
BASOPHILS # BLD AUTO: 0.02 K/UL — SIGNIFICANT CHANGE UP (ref 0–0.2)
BASOPHILS NFR BLD AUTO: 0.5 % — SIGNIFICANT CHANGE UP (ref 0–2)
BILIRUB SERPL-MCNC: 2.8 MG/DL — HIGH (ref 0.4–2)
BUN SERPL-MCNC: 18 MG/DL — SIGNIFICANT CHANGE UP (ref 8–20)
CALCIUM SERPL-MCNC: 9.4 MG/DL — SIGNIFICANT CHANGE UP (ref 8.6–10.2)
CHLORIDE SERPL-SCNC: 101 MMOL/L — SIGNIFICANT CHANGE UP (ref 98–107)
CO2 SERPL-SCNC: 23 MMOL/L — SIGNIFICANT CHANGE UP (ref 22–29)
CREAT SERPL-MCNC: 1.23 MG/DL — SIGNIFICANT CHANGE UP (ref 0.5–1.3)
EOSINOPHIL # BLD AUTO: 0.04 K/UL — SIGNIFICANT CHANGE UP (ref 0–0.5)
EOSINOPHIL NFR BLD AUTO: 1.1 % — SIGNIFICANT CHANGE UP (ref 0–6)
GLUCOSE SERPL-MCNC: 133 MG/DL — HIGH (ref 70–115)
HCT VFR BLD CALC: 40.4 % — SIGNIFICANT CHANGE UP (ref 39–50)
HGB BLD-MCNC: 12.4 G/DL — LOW (ref 13–17)
IMM GRANULOCYTES NFR BLD AUTO: 0.5 % — SIGNIFICANT CHANGE UP (ref 0–1.5)
INR BLD: 1.45 RATIO — HIGH (ref 0.88–1.16)
LYMPHOCYTES # BLD AUTO: 0.95 K/UL — LOW (ref 1–3.3)
LYMPHOCYTES # BLD AUTO: 26.1 % — SIGNIFICANT CHANGE UP (ref 13–44)
MCHC RBC-ENTMCNC: 30.4 PG — SIGNIFICANT CHANGE UP (ref 27–34)
MCHC RBC-ENTMCNC: 30.7 GM/DL — LOW (ref 32–36)
MCV RBC AUTO: 99 FL — SIGNIFICANT CHANGE UP (ref 80–100)
MONOCYTES # BLD AUTO: 0.29 K/UL — SIGNIFICANT CHANGE UP (ref 0–0.9)
MONOCYTES NFR BLD AUTO: 8 % — SIGNIFICANT CHANGE UP (ref 2–14)
NEUTROPHILS # BLD AUTO: 2.32 K/UL — SIGNIFICANT CHANGE UP (ref 1.8–7.4)
NEUTROPHILS NFR BLD AUTO: 63.8 % — SIGNIFICANT CHANGE UP (ref 43–77)
NT-PROBNP SERPL-SCNC: HIGH PG/ML (ref 0–300)
PLATELET # BLD AUTO: 150 K/UL — SIGNIFICANT CHANGE UP (ref 150–400)
POTASSIUM SERPL-MCNC: 4.4 MMOL/L — SIGNIFICANT CHANGE UP (ref 3.5–5.3)
POTASSIUM SERPL-SCNC: 4.4 MMOL/L — SIGNIFICANT CHANGE UP (ref 3.5–5.3)
PROT SERPL-MCNC: 7.6 G/DL — SIGNIFICANT CHANGE UP (ref 6.6–8.7)
PROTHROM AB SERPL-ACNC: 16.9 SEC — HIGH (ref 10–12.9)
RBC # BLD: 4.08 M/UL — LOW (ref 4.2–5.8)
RBC # FLD: 16.9 % — HIGH (ref 10.3–14.5)
SODIUM SERPL-SCNC: 137 MMOL/L — SIGNIFICANT CHANGE UP (ref 135–145)
TROPONIN T SERPL-MCNC: 0.02 NG/ML — SIGNIFICANT CHANGE UP (ref 0–0.06)
WBC # BLD: 3.64 K/UL — LOW (ref 3.8–10.5)
WBC # FLD AUTO: 3.64 K/UL — LOW (ref 3.8–10.5)

## 2019-11-19 PROCEDURE — 99285 EMERGENCY DEPT VISIT HI MDM: CPT

## 2019-11-19 PROCEDURE — 71046 X-RAY EXAM CHEST 2 VIEWS: CPT | Mod: 26

## 2019-11-19 PROCEDURE — 93010 ELECTROCARDIOGRAM REPORT: CPT

## 2019-11-19 PROCEDURE — 99223 1ST HOSP IP/OBS HIGH 75: CPT

## 2019-11-19 PROCEDURE — 93970 EXTREMITY STUDY: CPT | Mod: 26

## 2019-11-19 RX ORDER — AMIODARONE HYDROCHLORIDE 400 MG/1
200 TABLET ORAL DAILY
Refills: 0 | Status: DISCONTINUED | OUTPATIENT
Start: 2019-11-19 | End: 2019-11-26

## 2019-11-19 RX ORDER — FUROSEMIDE 40 MG
40 TABLET ORAL ONCE
Refills: 0 | Status: COMPLETED | OUTPATIENT
Start: 2019-11-19 | End: 2019-11-19

## 2019-11-19 RX ORDER — SODIUM CHLORIDE 9 MG/ML
1000 INJECTION, SOLUTION INTRAVENOUS
Refills: 0 | Status: DISCONTINUED | OUTPATIENT
Start: 2019-11-19 | End: 2019-11-26

## 2019-11-19 RX ORDER — INSULIN LISPRO 100/ML
VIAL (ML) SUBCUTANEOUS
Refills: 0 | Status: DISCONTINUED | OUTPATIENT
Start: 2019-11-19 | End: 2019-11-26

## 2019-11-19 RX ORDER — GLUCAGON INJECTION, SOLUTION 0.5 MG/.1ML
1 INJECTION, SOLUTION SUBCUTANEOUS ONCE
Refills: 0 | Status: DISCONTINUED | OUTPATIENT
Start: 2019-11-19 | End: 2019-11-26

## 2019-11-19 RX ORDER — ASPIRIN/CALCIUM CARB/MAGNESIUM 324 MG
81 TABLET ORAL DAILY
Refills: 0 | Status: DISCONTINUED | OUTPATIENT
Start: 2019-11-19 | End: 2019-11-26

## 2019-11-19 RX ORDER — LISINOPRIL 2.5 MG/1
5 TABLET ORAL DAILY
Refills: 0 | Status: DISCONTINUED | OUTPATIENT
Start: 2019-11-19 | End: 2019-11-20

## 2019-11-19 RX ORDER — DEXTROSE 50 % IN WATER 50 %
15 SYRINGE (ML) INTRAVENOUS ONCE
Refills: 0 | Status: DISCONTINUED | OUTPATIENT
Start: 2019-11-19 | End: 2019-11-26

## 2019-11-19 RX ORDER — ATORVASTATIN CALCIUM 80 MG/1
20 TABLET, FILM COATED ORAL AT BEDTIME
Refills: 0 | Status: DISCONTINUED | OUTPATIENT
Start: 2019-11-19 | End: 2019-11-26

## 2019-11-19 RX ORDER — CARVEDILOL PHOSPHATE 80 MG/1
3.12 CAPSULE, EXTENDED RELEASE ORAL EVERY 12 HOURS
Refills: 0 | Status: DISCONTINUED | OUTPATIENT
Start: 2019-11-19 | End: 2019-11-26

## 2019-11-19 RX ORDER — ENOXAPARIN SODIUM 100 MG/ML
80 INJECTION SUBCUTANEOUS EVERY 12 HOURS
Refills: 0 | Status: DISCONTINUED | OUTPATIENT
Start: 2019-11-19 | End: 2019-11-25

## 2019-11-19 RX ORDER — FUROSEMIDE 40 MG
40 TABLET ORAL
Refills: 0 | Status: DISCONTINUED | OUTPATIENT
Start: 2019-11-20 | End: 2019-11-21

## 2019-11-19 RX ORDER — DEXTROSE 50 % IN WATER 50 %
12.5 SYRINGE (ML) INTRAVENOUS ONCE
Refills: 0 | Status: DISCONTINUED | OUTPATIENT
Start: 2019-11-19 | End: 2019-11-26

## 2019-11-19 RX ADMIN — AMIODARONE HYDROCHLORIDE 200 MILLIGRAM(S): 400 TABLET ORAL at 17:52

## 2019-11-19 RX ADMIN — Medication 81 MILLIGRAM(S): at 17:53

## 2019-11-19 RX ADMIN — LISINOPRIL 5 MILLIGRAM(S): 2.5 TABLET ORAL at 17:53

## 2019-11-19 RX ADMIN — Medication 40 MILLIGRAM(S): at 17:52

## 2019-11-19 RX ADMIN — CARVEDILOL PHOSPHATE 3.12 MILLIGRAM(S): 80 CAPSULE, EXTENDED RELEASE ORAL at 17:52

## 2019-11-19 RX ADMIN — ENOXAPARIN SODIUM 80 MILLIGRAM(S): 100 INJECTION SUBCUTANEOUS at 17:52

## 2019-11-19 NOTE — ED ADULT NURSE NOTE - OBJECTIVE STATEMENT
73yo male c/o worsening swelling x 3 days. pt w/ extensive cardiac hx states has not taken any meds in unknown amount of time. states he does not like the way it feels and his "roommate is driving him crazy" + 3 BLE and BUE edema. lungs clear. + distal pulses. skin warm and dry, color appropriate for race. states still making urine. denies chest pain, sob, fever, chills, n/v/d

## 2019-11-19 NOTE — ED ADULT NURSE REASSESSMENT NOTE - NS ED NURSE REASSESS COMMENT FT1
pt a&ox3, denies any pain/discomfort, sob. meds as rxd. remains on cardiac monitor. urinal at bedside. pending bed and further testing. updated on plan of care, verbalize understanding. call bell in reach

## 2019-11-19 NOTE — H&P ADULT - HISTORY OF PRESENT ILLNESS
73 y/o male PMH Afib s/p cardioversion 11/2018 (previously on coumadin), HTN, DM, HLD, CAD s/p 3 V CABG, Mitral Valve repair, MARY LOU clip, ICM EF <20%, NYHA IIIA, [employer] ICD presents to ED with c/o several days worsening SOB, dyspnea, edema. Pt non compliant with medications. pt states sometimes live in homeless center, as per ED Doc, family wanting to move him to [place] but pt insists on going back to [place]. Pt is poor historian, endorses shortness of breath, orthopnea, dyspnea on exertion, "swollen everywhere", diarrhea . Denies fever, chills, cough, phlegm production, chest pain, pressure, nausea, vomiting, abd pain or urinary complaints. Daughter called for further information as pt is a poor historian. As pr daughter pt has intermittent sporadic behavior where he stays in their rented apartment for sometime and then suddenly packs his bags and leaves to go somewhere else. This has become more frequent after the passing of her mom in 2004. Pt was a heavy smoker & an alcoholic in th past. No diagnosis of any psychiatric illness in the past.  As per pt he hasnt taken his meds for months & has not seen a doctor for years.

## 2019-11-19 NOTE — ED PROVIDER NOTE - ATTENDING CONTRIBUTION TO CARE
Bollag: Patient seen along with medical student for educational purposes. Note was documented under my direct supervision.

## 2019-11-19 NOTE — CONSULT NOTE ADULT - SUBJECTIVE AND OBJECTIVE BOX
[place] [facility]                                                               Office:  [address] [address]                                                              Telephone: [phone]. Fax:[phone]                                                                        CARDIOLOGY CONSULTATION NOTE                                                                                             Consult requested by:  Dr. Humphreys  Reason for Consultation: heart failure  History obtained by: Patient and medical record   obtained: No    Chief complaint:    Patient is a 74y old  Male who presents with a chief complaint of edema      HPI: 73 y/o male PMH Afib s/p cardioversion 11/2018 (previously on coumadin), HTN, DM, HLD, CAD s/p 3 V CABG, Mitral Valve repair, MARY LOU clip, ICM EF <20%, NYHA IIIA, BoSci ICD presents to ED with c/o several days worsening SOB, dyspnea, edema. Pt non compliant with medications. pt states sometimes live in homeless center, as per ED Doc, family wanting to move him to [place] but pt insists on going back to [place]. Pt is poor historian, e      REVIEW OF SYMPTOMS:     CONSTITUTIONAL: No fever, weight loss, or fatigue  ENMT:  No difficulty hearing, tinnitus, vertigo; No sinus or throat pain  NECK: No pain or stiffness  CARDIOVASCULAR: No chest pain, dyspnea, syncope, palpitations, dizziness, Orthopnea, Paroxsymal nocturnal dyspnea  RESPIRATORY: No Dyspnea on exertion, Shortness of breath, cough, wheezing  : No dysuria, no hematuria   GI: No dark color stool, no melena, no diarrhea, no constipation, no abdominal pain   NEURO: No headache, no dizziness, no slurred speech   MUSCULOSKELETAL: No joint pain or swelling; No muscle, back, or extremity pain  PSYCH: No agitation, no anxiety.    ALL OTHER REVIEW OF SYSTEMS ARE NEGATIVE.      PREVIOUS DIAGNOSTIC TESTING  ECHO FINDINGS:      STRESS FINDINGS:      CATHETERIZATION FINDINGS:         ALLERGIES: Allergies    No Known Allergies    Intolerances          PAST MEDICAL HISTORY  Cardiomyopathy  AF (atrial fibrillation)  MR (mitral regurgitation)  DM (diabetes mellitus)  CAD (coronary artery disease)  Ejection fraction < 50%  LBBB (left bundle branch block)  CHF (congestive heart failure)  HTN (hypertension)      PAST SURGICAL HISTORY  Status post mitral valve repair  S/P CABG x 3  HLD (hyperlipidemia)  LBBB (left bundle branch block)  Mitral valve regurgitation  Chronic GERD  Atrial fibrillation  Diabetes mellitus  HTN (hypertension)  CHF (congestive heart failure), NYHA class III  Ischemic cardiomyopathy  CAD (coronary artery disease)  No significant past surgical history      FAMILY HISTORY:  No pertinent family history in first degree relatives      SOCIAL HISTORY:  Denies smoking/alcohol/drugs  CIGARETTES:     ALCOHOL:  DRUGS:      CURRENT MEDICATIONS:           HOME MEDICATIONS:      Vital Signs Last 24 Hrs  T(C): 36.9 (19 Nov 2019 12:31), Max: 36.9 (19 Nov 2019 12:31)  T(F): 98.5 (19 Nov 2019 12:31), Max: 98.5 (19 Nov 2019 12:31)  HR: 70 (19 Nov 2019 12:31) (70 - 70)  BP: 138/83 (19 Nov 2019 12:31) (138/83 - 138/83)  BP(mean): --  RR: 18 (19 Nov 2019 12:31) (18 - 18)  SpO2: 99% (19 Nov 2019 12:31) (99% - 99%)      PHYSICAL EXAM:  Constitutional: Comfortable . No acute distress.   HEENT: Atraumatic and normocephalic , neck is supple . no JVD. No carotid bruit. PEERL   CNS: A&Ox3. No focal deficits. EOMI. Cranial nerves II-IX are intact.   Lymph Nodes: Cervical : Not palpable.  Respiratory: CTAB  Cardiovascular: S1S2 RRR. No murmur/rubs or gallop.  Gastrointestinal: Soft non-tender and non distended . +Bowel sounds. negative Quan's sign.  Extremities: No edema.   Psychiatric: Calm . no agitation.  Skin: No skin rash/ulcers visualized to face, hands or feet.    Intake and output:     LABS:            ;p-BNP=        INTERPRETATION OF TELEMETRY: Reviewed by me.   ECG: Reviewed by me.     RADIOLOGY & ADDITIONAL STUDIES:    X-ray:  reviewed by me.   CT scan:   MRI: [place] [facility]                                                               Office:  [address] [address]                                                              Telephone: [phone]. Fax:[phone]                                                                        CARDIOLOGY CONSULTATION NOTE                                                                                             Consult requested by:  Dr. Humphreys  Reason for Consultation: heart failure  History obtained by: Patient and medical record   obtained: No    Chief complaint:    Patient is a 74y old  Male who presents with a chief complaint of edema      HPI: 73 y/o male PMH Afib s/p cardioversion 11/2018 (previously on coumadin), HTN, DM, HLD, CAD s/p 3 V CABG, Mitral Valve repair, MARY LOU clip, ICM EF <20%, NYHA IIIA, BoSci ICD presents to ED with c/o several days worsening SOB, dyspnea, edema. Pt non compliant with medications. pt states sometimes live in homeless center, as per ED Doc, family wanting to move him to [place] but pt insists on going back to [place]. Pt is poor historian, endorses shortness of breath, orthopnea, dyspnea on exertion, "swollen everywhere", diarrhea . Denies fever, chills, cough, phlegm production, chest pain, pressure, nausea, vomiting.         REVIEW OF SYMPTOMS:   CONSTITUTIONAL: No fever, weight loss, or fatigue  ENMT:  No difficulty hearing, tinnitus, vertigo; No sinus or throat pain  NECK: No pain or stiffness  CARDIOVASCULAR: + dyspnea, + orthopnea No chest pain, syncope, palpitations, dizziness, Paroxsymal nocturnal dyspnea  RESPIRATORY: + Dyspnea on exertion, Shortness of breath No cough, no wheezing  : No dysuria, no hematuria   GI: + diarrhea; No dark color stool, no melena,, no constipation, no abdominal pain   NEURO: No headache, no dizziness, no slurred speech   MUSCULOSKELETAL: No joint pain or swelling; No muscle, back, or extremity pain  PSYCH: No agitation, no anxiety.    ALL OTHER REVIEW OF SYSTEMS ARE NEGATIVE.      PREVIOUS DIAGNOSTIC TESTING  ECHO FINDINGS:  < from: [facility] Echo Doppler (11.07.18 @ 10:22) >  PHYSICIAN INTERPRETATION:  Left Ventricle: The left ventricular internal cavity size is moderately   increased.  Left ventricular ejection fraction, by visual estimation, is <20%.  Right Ventricle: The right ventricular size is mildly enlarged. RV   systolic function is severely reduced.  Left Atrium: Severely enlarged left atrium. No left atrial appendage   thrombus is seen. S/p left atrial appendage ligation.  Right Atrium: Mildly enlarged right atrium.  Pericardium: There is no evidence of pericardial effusion.  Mitral Valve: Status-post mitral annular ring insertion. Mild to moderate   mitral valve regurgitation is seen.  Tricuspid Valve: Structurally normal tricuspid valve, with normal leaflet   excursion. Mild tricuspid regurgitation is visualized.  Aortic Valve: The aortic valve is trileaflet. Mild aorticvalve   regurgitation is seen.  Pulmonic Valve: Structurally normal pulmonic valve, with normal leaflet   excursion. Trace pulmonic valve regurgitation.  Aorta: The aortic root is normal in size and structure.  Pulmonary Artery: The main pulmonary artery is normal in size.  Venous: A systolic blunting flow pattern is recorded from three pulmonary   veins.       Summary:   1. No cardiac mass, vegetations, thrombus or shunts visualized.   2. Severely enlarged left atrium.   3. No left atrial or left atrial appendage thrombus visualized. No   residual appendage visualized. No PFO.   4. Left ventricular ejection fraction, by visual estimation, is <20%.   5. Mildly enlarged right atrium.   6. The right ventricular size is mildly enlarged. Severelyreduced RV   systolic function.   7. S/p left atrial appendage ligation.   8. Status-post mitral annular ring insertion. Mild to moderate mitral   valve regurgitation. Mean gradient = 3 mm Hg.   9. Mild tricuspid regurgitation.  10. Mild aortic regurgitation.  11. There is no evidence of pericardial effusion.  12. Patient successfully converted to sinus rhythm with 250 J x 1 of   direct current cardioversion.      CATHETERIZATION FINDINGS:   < from: Cardiac Cath Lab - Adult (12.14.16 @ 16:48) >  CORONARY VESSELS: The coronary circulation is right dominant.  LM:   --  LM: Angiography showed minor luminal irregularities with no flow  limiting lesions.  LAD:   --  LAD: There was a 80 % stenosis in the proximal third of the  vessel segment.  --  Mid LAD: There was a 80 % stenosis.  --  Distal LAD: There was a 70 % stenosis.  CX:   --  Circumflex: Angiography showed mild atherosclerosis with no flow  limiting lesions.  --  OM1: There was a 70 % stenosis in the middle third of the vessel  segment.  RI:   --  Ramus intermedius: There was a 90 % stenosis in the middle third  of the vessel segment.  RCA:   --  Distal RCA: There was a 70 % stenosis.  COMPLICATIONS: No complications occurred during the cath lab visit.  DIAGNOSTIC IMPRESSIONS: Multi-vessel disease including long diffuse LAD  disease in the setting of severe cardiomyopathy. Mildly elevated LVEDP.  DIAGNOSTIC RECOMMENDATIONS: CT surgery evaluation for possible CABG.  INTERVENTIONAL IMPRESSIONS: Multi-vessel disease including long diffuse LAD  disease in the setting of severe cardiomyopathy. Mildly elevated LVEDP.  INTERVENTIONAL RECOMMENDATIONS: CT surgery evaluation for possible CABG.  Prepared and signed by  Dada Lozano DO  Signed 12/14/2016 17:57:22    < end of copied text >      ALLERGIES: Allergies  No Known Allergies  Intolerances      PAST MEDICAL HISTORY  Cardiomyopathy  AF (atrial fibrillation)  MR (mitral regurgitation)  DM (diabetes mellitus)  CAD (coronary artery disease)  Ejection fraction < 50%  LBBB (left bundle branch block)  CHF (congestive heart failure)  HTN (hypertension)      PAST SURGICAL HISTORY  Status post mitral valve repair  S/P CABG x 3  HLD (hyperlipidemia)  LBBB (left bundle branch block)  Mitral valve regurgitation  Chronic GERD  Atrial fibrillation  Diabetes mellitus  HTN (hypertension)  CHF (congestive heart failure), NYHA class III  Ischemic cardiomyopathy  CAD (coronary artery disease)        FAMILY HISTORY:  No pertinent family history in first degree relatives      SOCIAL HISTORY:  Denies smoking/alcohol/drugs      CURRENT MEDICATIONS:          HOME MEDICATIONS:  non adherent   "tylenol"    Vital Signs Last 24 Hrs  T(C): 36.9 (19 Nov 2019 12:31), Max: 36.9 (19 Nov 2019 12:31)  T(F): 98.5 (19 Nov 2019 12:31), Max: 98.5 (19 Nov 2019 12:31)  HR: 70 (19 Nov 2019 12:31) (70 - 70)  BP: 138/83 (19 Nov 2019 12:31) (138/83 - 138/83)  BP(mean): --  RR: 18 (19 Nov 2019 12:31) (18 - 18)  SpO2: 99% (19 Nov 2019 12:31) (99% - 99%)      PHYSICAL EXAM:  Constitutional: Comfortable . No acute distress.   HEENT: Atraumatic and normocephalic , neck is supple . no JVD. icteric sclera No carotid bruit. PEERL   CNS: A&Ox3. No focal deficits. EOMI. Cranial nerves II-IX are intact.   Respiratory:  diminished at bases but CTAB  Cardiovascular: S1S2 RRR. No murmur/rubs or gallop.  Gastrointestinal: Soft non-tender and non distended . +Bowel sounds. negative Quan's sign.  Extremities: anasarca  Psychiatric: Calm . no agitation.  Skin: No skin rash/ulcers visualized to face, hands or feet.      LABS:            ;p-BNP=        INTERPRETATION OF TELEMETRY: Reviewed by me.   ECG: Reviewed by me.     RADIOLOGY & ADDITIONAL STUDIES:    X-ray:  reviewed by me.   CT scan:   MRI: [place] [facility]                                                               Office:  [address] [address]                                                              Telephone: [phone]. Fax:[phone]                                                                        CARDIOLOGY CONSULTATION NOTE                                                                                             Consult requested by:  Dr. Humphreys  Reason for Consultation: heart failure  History obtained by: Patient and medical record   obtained: No    Chief complaint:    Patient is a 74y old  Male who presents with a chief complaint of edema      HPI: 73 y/o male PMH Afib s/p cardioversion 11/2018 (previously on coumadin), HTN, DM, HLD, CAD s/p 3 V CABG, Mitral Valve repair, MARY LOU clip, ICM EF <20%, NYHA IIIA, BoSci ICD presents to ED with c/o several days worsening SOB, dyspnea, edema. Pt non compliant with medications. pt states sometimes live in homeless center, as per ED Doc, family wanting to move him to [place] but pt insists on going back to [place]. Pt is poor historian, endorses shortness of breath, orthopnea, dyspnea on exertion, "swollen everywhere", diarrhea . Denies fever, chills, cough, phlegm production, chest pain, pressure, nausea, vomiting.         REVIEW OF SYMPTOMS:   CONSTITUTIONAL: No fever, weight loss, or fatigue  ENMT:  No difficulty hearing, tinnitus, vertigo; No sinus or throat pain  NECK: No pain or stiffness  CARDIOVASCULAR: + dyspnea, + orthopnea No chest pain, syncope, palpitations, dizziness, Paroxsymal nocturnal dyspnea  RESPIRATORY: + Dyspnea on exertion, Shortness of breath No cough, no wheezing  : No dysuria, no hematuria   GI: + diarrhea; No dark color stool, no melena,, no constipation, no abdominal pain   NEURO: No headache, no dizziness, no slurred speech   MUSCULOSKELETAL: No joint pain or swelling; No muscle, back, or extremity pain  PSYCH: No agitation, no anxiety.    ALL OTHER REVIEW OF SYSTEMS ARE NEGATIVE.      PREVIOUS DIAGNOSTIC TESTING  ECHO FINDINGS:  < from: [facility] Echo Doppler (11.07.18 @ 10:22) >  PHYSICIAN INTERPRETATION:  Left Ventricle: The left ventricular internal cavity size is moderately   increased.  Left ventricular ejection fraction, by visual estimation, is <20%.  Right Ventricle: The right ventricular size is mildly enlarged. RV   systolic function is severely reduced.  Left Atrium: Severely enlarged left atrium. No left atrial appendage   thrombus is seen. S/p left atrial appendage ligation.  Right Atrium: Mildly enlarged right atrium.  Pericardium: There is no evidence of pericardial effusion.  Mitral Valve: Status-post mitral annular ring insertion. Mild to moderate   mitral valve regurgitation is seen.  Tricuspid Valve: Structurally normal tricuspid valve, with normal leaflet   excursion. Mild tricuspid regurgitation is visualized.  Aortic Valve: The aortic valve is trileaflet. Mild aorticvalve   regurgitation is seen.  Pulmonic Valve: Structurally normal pulmonic valve, with normal leaflet   excursion. Trace pulmonic valve regurgitation.  Aorta: The aortic root is normal in size and structure.  Pulmonary Artery: The main pulmonary artery is normal in size.  Venous: A systolic blunting flow pattern is recorded from three pulmonary   veins.       Summary:   1. No cardiac mass, vegetations, thrombus or shunts visualized.   2. Severely enlarged left atrium.   3. No left atrial or left atrial appendage thrombus visualized. No   residual appendage visualized. No PFO.   4. Left ventricular ejection fraction, by visual estimation, is <20%.   5. Mildly enlarged right atrium.   6. The right ventricular size is mildly enlarged. Severelyreduced RV   systolic function.   7. S/p left atrial appendage ligation.   8. Status-post mitral annular ring insertion. Mild to moderate mitral   valve regurgitation. Mean gradient = 3 mm Hg.   9. Mild tricuspid regurgitation.  10. Mild aortic regurgitation.  11. There is no evidence of pericardial effusion.  12. Patient successfully converted to sinus rhythm with 250 J x 1 of   direct current cardioversion.      CATHETERIZATION FINDINGS:   < from: Cardiac Cath Lab - Adult (12.14.16 @ 16:48) >  CORONARY VESSELS: The coronary circulation is right dominant.  LM:   --  LM: Angiography showed minor luminal irregularities with no flow  limiting lesions.  LAD:   --  LAD: There was a 80 % stenosis in the proximal third of the  vessel segment.  --  Mid LAD: There was a 80 % stenosis.  --  Distal LAD: There was a 70 % stenosis.  CX:   --  Circumflex: Angiography showed mild atherosclerosis with no flow  limiting lesions.  --  OM1: There was a 70 % stenosis in the middle third of the vessel  segment.  RI:   --  Ramus intermedius: There was a 90 % stenosis in the middle third  of the vessel segment.  RCA:   --  Distal RCA: There was a 70 % stenosis.  COMPLICATIONS: No complications occurred during the cath lab visit.  DIAGNOSTIC IMPRESSIONS: Multi-vessel disease including long diffuse LAD  disease in the setting of severe cardiomyopathy. Mildly elevated LVEDP.  DIAGNOSTIC RECOMMENDATIONS: CT surgery evaluation for possible CABG.  INTERVENTIONAL IMPRESSIONS: Multi-vessel disease including long diffuse LAD  disease in the setting of severe cardiomyopathy. Mildly elevated LVEDP.  INTERVENTIONAL RECOMMENDATIONS: CT surgery evaluation for possible CABG.  Prepared and signed by  Dada Lozano DO  Signed 12/14/2016 17:57:22    < end of copied text >      ALLERGIES: Allergies  No Known Allergies  Intolerances      PAST MEDICAL HISTORY  Cardiomyopathy  AF (atrial fibrillation)  MR (mitral regurgitation)  DM (diabetes mellitus)  CAD (coronary artery disease)  Ejection fraction < 50%  LBBB (left bundle branch block)  CHF (congestive heart failure)  HTN (hypertension)      PAST SURGICAL HISTORY  Status post mitral valve repair  S/P CABG x 3  HLD (hyperlipidemia)  LBBB (left bundle branch block)  Mitral valve regurgitation  Chronic GERD  Atrial fibrillation  Diabetes mellitus  HTN (hypertension)  CHF (congestive heart failure), NYHA class III  Ischemic cardiomyopathy  CAD (coronary artery disease)        FAMILY HISTORY:  No pertinent family history in first degree relatives      SOCIAL HISTORY:  Denies smoking/alcohol/drugs      CURRENT MEDICATIONS:          HOME MEDICATIONS:  non adherent   "tylenol"    Vital Signs Last 24 Hrs  T(C): 36.9 (19 Nov 2019 12:31), Max: 36.9 (19 Nov 2019 12:31)  T(F): 98.5 (19 Nov 2019 12:31), Max: 98.5 (19 Nov 2019 12:31)  HR: 70 (19 Nov 2019 12:31) (70 - 70)  BP: 138/83 (19 Nov 2019 12:31) (138/83 - 138/83)  BP(mean): --  RR: 18 (19 Nov 2019 12:31) (18 - 18)  SpO2: 99% (19 Nov 2019 12:31) (99% - 99%)      PHYSICAL EXAM:  Constitutional: Comfortable . No acute distress.   HEENT: Atraumatic and normocephalic , neck is supple . no JVD. icteric sclera No carotid bruit. PEERL   CNS: A&Ox3. No focal deficits. EOMI. Cranial nerves II-IX are intact.   Respiratory:  diminished at bases but CTAB  Cardiovascular: S1S2 RRR. No murmur/rubs or gallop.  Gastrointestinal: Soft non-tender and non distended . +Bowel sounds. negative Quan's sign.  Extremities: anasarca  Psychiatric: Calm . no agitation.  Skin: No skin rash/ulcers visualized to face, hands or feet.      LABS:            ;p-BNP=        INTERPRETATION OF TELEMETRY: 8 beats NSVT- 4pm  ECG: V paced    RADIOLOGY & ADDITIONAL STUDIES:    X-ray:  reviewed by me.   CT scan:   MRI: [place] [facility]                                                               Office:  [address] [address]                                                              Telephone: [phone]. Fax:[phone]                                                                        CARDIOLOGY CONSULTATION NOTE                                                                                             Consult requested by:  Dr. Humphreys  Reason for Consultation: heart failure  History obtained by: Patient and medical record   obtained: No    Chief complaint:    Patient is a 74y old  Male who presents with a chief complaint of edema    Primary Cardiologist: Dr Zac An    HPI: 75 y/o male PMH Afib s/p cardioversion 11/2018 (previously on coumadin), HTN, DM, HLD, CAD s/p 3 V CABG, Mitral Valve repair, MARY LOU clip, ICM EF <20%, NYHA IIIA, BoSci ICD presents to ED with c/o several days worsening SOB, dyspnea, edema. Pt non compliant with medications. pt states sometimes live in homeless center, as per ED Doc, family wanting to move him to [place] but pt insists on going back to [place]. Pt is poor historian, endorses shortness of breath, orthopnea, dyspnea on exertion, "swollen everywhere", diarrhea . Denies fever, chills, cough, phlegm production, chest pain, pressure, nausea, vomiting.         REVIEW OF SYMPTOMS:   CONSTITUTIONAL: No fever, weight loss, or fatigue  ENMT:  No difficulty hearing, tinnitus, vertigo; No sinus or throat pain  NECK: No pain or stiffness  CARDIOVASCULAR: + dyspnea, + orthopnea No chest pain, syncope, palpitations, dizziness, Paroxsymal nocturnal dyspnea  RESPIRATORY: + Dyspnea on exertion, Shortness of breath No cough, no wheezing  : No dysuria, no hematuria   GI: + diarrhea; No dark color stool, no melena,, no constipation, no abdominal pain   NEURO: No headache, no dizziness, no slurred speech   MUSCULOSKELETAL: No joint pain or swelling; No muscle, back, or extremity pain  PSYCH: No agitation, no anxiety.    ALL OTHER REVIEW OF SYSTEMS ARE NEGATIVE.      PREVIOUS DIAGNOSTIC TESTING  ECHO FINDINGS:  < from: [facility] Echo Doppler (11.07.18 @ 10:22) >  PHYSICIAN INTERPRETATION:  Left Ventricle: The left ventricular internal cavity size is moderately   increased.  Left ventricular ejection fraction, by visual estimation, is <20%.  Right Ventricle: The right ventricular size is mildly enlarged. RV   systolic function is severely reduced.  Left Atrium: Severely enlarged left atrium. No left atrial appendage   thrombus is seen. S/p left atrial appendage ligation.  Right Atrium: Mildly enlarged right atrium.  Pericardium: There is no evidence of pericardial effusion.  Mitral Valve: Status-post mitral annular ring insertion. Mild to moderate   mitral valve regurgitation is seen.  Tricuspid Valve: Structurally normal tricuspid valve, with normal leaflet   excursion. Mild tricuspid regurgitation is visualized.  Aortic Valve: The aortic valve is trileaflet. Mild aorticvalve   regurgitation is seen.  Pulmonic Valve: Structurally normal pulmonic valve, with normal leaflet   excursion. Trace pulmonic valve regurgitation.  Aorta: The aortic root is normal in size and structure.  Pulmonary Artery: The main pulmonary artery is normal in size.  Venous: A systolic blunting flow pattern is recorded from three pulmonary   veins.       Summary:   1. No cardiac mass, vegetations, thrombus or shunts visualized.   2. Severely enlarged left atrium.   3. No left atrial or left atrial appendage thrombus visualized. No   residual appendage visualized. No PFO.   4. Left ventricular ejection fraction, by visual estimation, is <20%.   5. Mildly enlarged right atrium.   6. The right ventricular size is mildly enlarged. Severelyreduced RV   systolic function.   7. S/p left atrial appendage ligation.   8. Status-post mitral annular ring insertion. Mild to moderate mitral   valve regurgitation. Mean gradient = 3 mm Hg.   9. Mild tricuspid regurgitation.  10. Mild aortic regurgitation.  11. There is no evidence of pericardial effusion.  12. Patient successfully converted to sinus rhythm with 250 J x 1 of   direct current cardioversion.      CATHETERIZATION FINDINGS:   < from: Cardiac Cath Lab - Adult (12.14.16 @ 16:48) >  CORONARY VESSELS: The coronary circulation is right dominant.  LM:   --  LM: Angiography showed minor luminal irregularities with no flow  limiting lesions.  LAD:   --  LAD: There was a 80 % stenosis in the proximal third of the  vessel segment.  --  Mid LAD: There was a 80 % stenosis.  --  Distal LAD: There was a 70 % stenosis.  CX:   --  Circumflex: Angiography showed mild atherosclerosis with no flow  limiting lesions.  --  OM1: There was a 70 % stenosis in the middle third of the vessel  segment.  RI:   --  Ramus intermedius: There was a 90 % stenosis in the middle third  of the vessel segment.  RCA:   --  Distal RCA: There was a 70 % stenosis.  COMPLICATIONS: No complications occurred during the cath lab visit.  DIAGNOSTIC IMPRESSIONS: Multi-vessel disease including long diffuse LAD  disease in the setting of severe cardiomyopathy. Mildly elevated LVEDP.  DIAGNOSTIC RECOMMENDATIONS: CT surgery evaluation for possible CABG.  INTERVENTIONAL IMPRESSIONS: Multi-vessel disease including long diffuse LAD  disease in the setting of severe cardiomyopathy. Mildly elevated LVEDP.  INTERVENTIONAL RECOMMENDATIONS: CT surgery evaluation for possible CABG.  Prepared and signed by  Dada Lozano DO  Signed 12/14/2016 17:57:22    < end of copied text >      ALLERGIES: Allergies  No Known Allergies  Intolerances      PAST MEDICAL HISTORY  Cardiomyopathy  AF (atrial fibrillation)  MR (mitral regurgitation)  DM (diabetes mellitus)  CAD (coronary artery disease)  Ejection fraction < 50%  LBBB (left bundle branch block)  CHF (congestive heart failure)  HTN (hypertension)      PAST SURGICAL HISTORY  Status post mitral valve repair  S/P CABG x 3  HLD (hyperlipidemia)  LBBB (left bundle branch block)  Mitral valve regurgitation  Chronic GERD  Atrial fibrillation  Diabetes mellitus  HTN (hypertension)  CHF (congestive heart failure), NYHA class III  Ischemic cardiomyopathy  CAD (coronary artery disease)        FAMILY HISTORY:  No pertinent family history in first degree relatives      SOCIAL HISTORY:  Denies smoking/alcohol/drugs      CURRENT MEDICATIONS:          HOME MEDICATIONS:  non adherent   "tylenol"    Vital Signs Last 24 Hrs  T(C): 36.9 (19 Nov 2019 12:31), Max: 36.9 (19 Nov 2019 12:31)  T(F): 98.5 (19 Nov 2019 12:31), Max: 98.5 (19 Nov 2019 12:31)  HR: 70 (19 Nov 2019 12:31) (70 - 70)  BP: 138/83 (19 Nov 2019 12:31) (138/83 - 138/83)  BP(mean): --  RR: 18 (19 Nov 2019 12:31) (18 - 18)  SpO2: 99% (19 Nov 2019 12:31) (99% - 99%)      PHYSICAL EXAM:  Constitutional: Comfortable . No acute distress.   HEENT: Atraumatic and normocephalic , neck is supple . no JVD. icteric sclera No carotid bruit. PEERL   CNS: A&Ox3. No focal deficits. EOMI. Cranial nerves II-IX are intact.   Respiratory:  diminished at bases but CTAB  Cardiovascular: S1S2 RRR. No murmur/rubs or gallop.  Gastrointestinal: Soft non-tender and non distended . +Bowel sounds. negative Quan's sign.  Extremities: anasarca  Psychiatric: Calm . no agitation.  Skin: No skin rash/ulcers visualized to face, hands or feet.      LABS:            ;p-BNP=        INTERPRETATION OF TELEMETRY: 8 beats NSVT- 4pm  ECG: V paced    RADIOLOGY & ADDITIONAL STUDIES:    X-ray:  reviewed by me.   CT scan:   MRI:

## 2019-11-19 NOTE — CONSULT NOTE ADULT - SUBJECTIVE AND OBJECTIVE BOX
Patient is a 74y old  Male who presents with a chief complaint of     HPI:    73 y/o male PMH Afib s/p cardioversion 11/2018 (previously on coumadin), HTN, DM, HLD, CAD s/p 3 V CABG, Mitral Valve repair, MARY LOU clip, ICM EF <20%, NYHA IIIA, BoSci ICD presents to ED with c/o several days worsening SOB, dyspnea, edema. Pt is a poor historian.  Family members went home.  Pt says he came to the hospital because he was "swollen".  He said it started about 2 weeks ago.  After further questioning, he said that he also came in because of shortness of breath.  Per chart, Pt non compliant with medications. pt states sometimes live in homeless center, as per ED Doc, family wanting to move him to [place] but pt insists on going back to [place]. Pt is poor historian, endorses shortness of breath, orthopnea, dyspnea on exertion, "swollen everywhere", diarrhea . Denies fever, chills, cough, phlegm production, chest pain, pressure, nausea, vomiting.     Pt states he doesn't know what medications he's on and that his daughter takes care of all that.      In the ED, pt received one dose of 40mg IV lasix and cardio was consulted.   First set of troponins were negative. BNP elevated at [id number]      REVIEW OF SYSTEMS:  CONSTITUTIONAL: No fever, weight loss, or fatigue  EYES: No eye pain, visual disturbances, or discharge  ENMT:  No difficulty hearing, tinnitus, vertigo; No sinus or throat pain  NECK: No pain or stiffness  RESPIRATORY: SOB before, currently not having SOB. No cough, wheezing, chills or hemoptysis;   CARDIOVASCULAR: No chest pain, palpitations, dizziness, or leg swelling  GASTROINTESTINAL: No abdominal or epigastric pain. No nausea, vomiting, or hematemesis; No diarrhea or constipation. No melena or hematochezia.  GENITOURINARY: No dysuria, frequency, hematuria, or incontinence  NEUROLOGICAL: mild headache before, no issues right now, memory loss, loss of strength, numbness, or tremors  SKIN: No itching, burning, rashes, or lesions   MUSCULOSKELETAL: feels very weak, No muscle or back pain  PSYCHIATRIC: Feels sad sometimes but right now feels okay. no anxiety, mood swings, or difficulty sleeping    PAST MEDICAL & SURGICAL HISTORY:  Cardiomyopathy  AF (atrial fibrillation)  MR (mitral regurgitation)  DM (diabetes mellitus)  CAD (coronary artery disease)  Ejection fraction < 50%: as per  echo  10-29-18   ef&lt;20%  LBBB (left bundle branch block)  CHF (congestive heart failure): chronic systolic congestive heart failure  HTN (hypertension)  Status post mitral valve repair  S/P CABG x 3      Allergies    No Known Allergies    Intolerances      FAMILY HISTORY:  N/C    Vital Signs Last 24 Hrs  T(C): 36.9 (19 Nov 2019 12:31), Max: 36.9 (19 Nov 2019 12:31)  T(F): 98.5 (19 Nov 2019 12:31), Max: 98.5 (19 Nov 2019 12:31)  HR: 70 (19 Nov 2019 12:31) (70 - 70)  BP: 138/83 (19 Nov 2019 12:31) (138/83 - 138/83)  BP(mean): --  RR: 18 (19 Nov 2019 12:31) (18 - 18)  SpO2: 99% (19 Nov 2019 12:31) (99% - 99%)    PHYSICAL EXAM:    GENERAL: NAD  HEAD:  Atraumatic, Normocephalic      LABS:                        12.4   3.64  )-----------( 150      ( 19 Nov 2019 15:11 )             40.4     11-19    137  |  101  |  18.0  ----------------------------<  133<H>  4.4   |  23.0  |  1.23    Ca    9.4      19 Nov 2019 15:11    TPro  7.6  /  Alb  4.0  /  TBili  2.8<H>  /  DBili  x   /  AST  26  /  ALT  15  /  AlkPhos  109  11-19    PT/INR - ( 19 Nov 2019 15:11 )   PT: 16.9 sec;   INR: 1.45 ratio         PTT - ( 19 Nov 2019 15:11 )  PTT:33.3 sec    CAPILLARY BLOOD GLUCOSE          RADIOLOGY & ADDITIONAL STUDIES:    EKG:   Personally Reviewed:  [ ] YES     Imaging:   Personally Reviewed:  [ ] YES     Consultant(s) Notes Reviewed:      Care Discussed with Consultants/Other Providers:

## 2019-11-19 NOTE — CONSULT NOTE ADULT - ASSESSMENT
lisinopril 5  digoxin .125  coreg 3.125  asa 81  atorvastatin 40  lasix 40   duplex b/l LE  spironolactone 25  TTE 75 y/o male PMH Afib s/p cardioversion 11/2018 (previously on coumadin), HTN, DM, HLD, CAD s/p 3 V CABG, Mitral Valve repair, MARY LOU clip, ICM EF <20%, NYHA IIIA, BoSci ICD presents to ED with c/o several days worsening SOB, dyspnea, edema. Pt non compliant with medications. pt states sometimes live in homeless center, as per ED Doc, family wanting to move him to [place] but pt insists on going back to [place]. Pt is poor historian, endorses shortness of breath, orthopnea, dyspnea on exertion, "swollen everywhere", diarrhea.    Acute on Chronic HFrEF  - non adherent with medications  - RANDOLPH 2018- EF < 20%, severely enlarged LA, severely reduced LV RV systolic function  - Start lisinopril 5mg daily, digoxin 0.125mg daily, coreg 3.125mg daily  - diurese Lasix 40Mg IV BID, Spironolactone 25mg PO daily  - Repeat TTE  - BoSci ICD, called for interrogation   - B/L LE venous doppler  - Monitor on telemetry.   - Strict i/o and daily standing weights (if possible).   - Keep K > 4, Mg > 2.   - Monitor renal function with ongoing diuresis.      CAD s/p CABD  - asa 81 daily  - statin  - BB as above    Afib  - s/p cardioversion   - start lovenox BID for now, will need AC out patient   - ? on amiodarone, telemetry monitoring for now. 75 y/o male PMH Afib s/p cardioversion 11/2018 (previously on coumadin), HTN, DM, HLD, CAD s/p 3 V CABG, Mitral Valve repair, MARY LOU clip, ICM EF <20%, NYHA IIIA, BoSci ICD presents to ED with c/o several days worsening SOB, dyspnea, edema. Pt non compliant with medications. pt states sometimes live in homeless center, as per ED Doc, family wanting to move him to [place] but pt insists on going back to [place]. Pt is poor historian, endorses shortness of breath, orthopnea, dyspnea on exertion, "swollen everywhere", diarrhea.    Acute on Chronic HFrEF  - non adherent with medications  - RANDOLPH 2018- EF < 20%, severely enlarged LA, severely reduced LV RV systolic function  - Start lisinopril 5mg daily, digoxin 0.125mg daily, coreg 3.125mg daily  - diurese Lasix 40Mg IV BID, Spironolactone 25mg PO daily  - Repeat TTE  - BoSci ICD, called for interrogation- no VT, AFib, Heart lOgic threshold >20 two weeks ago, today almost 0. thoracic impedence 16. eval in chart.   - B/L LE venous doppler  - Monitor on telemetry.   - Strict i/o and daily standing weights (if possible).   - Keep K > 4, Mg > 2.   - Monitor renal function with ongoing diuresis.      CAD s/p CABD  - asa 81 daily  - statin- all labs pending  - BB as above    Afib  - s/p cardioversion   - start lovenox BID for now, will need AC out patient (labs pending)  - ? on amiodarone, telemetry monitoring for now. 73 y/o male PMH Afib s/p cardioversion 11/2018 (previously on coumadin), HTN, DM, HLD, CAD s/p 3 V CABG, Mitral Valve repair, MARY LOU clip, ICM EF <20%, NYHA IIIA, BoSci ICD presents to ED with c/o several days worsening SOB, dyspnea, edema. Pt non compliant with medications. pt states sometimes live in homeless center, as per ED Doc, family wanting to move him to [place] but pt insists on going back to [place]. Pt is poor historian, endorses shortness of breath, orthopnea, dyspnea on exertion, "swollen everywhere", diarrhea.    Acute on Chronic HFrEF  - non adherent with medications  - RANDOLPH 2018- EF < 20%, severely enlarged LA, severely reduced LV RV systolic function  - Start lisinopril 5mg daily, digoxin 0.125mg daily, coreg 3.125mg daily  - diurese Lasix 40Mg IV BID, Spironolactone 25mg PO daily  - Repeat TTE  - BoSci ICD, called for interrogation- no VT, AFib, Heart lOgic threshold >20 two weeks ago, today almost 0. thoracic impedence 16. eval in chart.   - B/L LE venous doppler  - Monitor on telemetry.   - Strict i/o and daily standing weights (if possible).   - Keep K > 4, Mg > 2.   - Monitor renal function with ongoing diuresis.    Atrial Fibrillation and other medical problems    CAD s/p CABD  - asa 81 daily  - statin- all labs pending  - BB as above    Afib  - s/p cardioversion   - start lovenox BID for now, will need AC out patient (labs pending)  - ? on amiodarone, telemetry monitoring for now.

## 2019-11-19 NOTE — H&P ADULT - NSHPPHYSICALEXAM_GEN_ALL_CORE
PHYSICAL EXAM-  GENERAL: NAD  HEAD:  Atraumatic, Normocephalic  EYES: EOMI, PERRLA, conjunctiva and sclera clear  NECK: Supple, No JVD  NERVOUS SYSTEM:  Alert & Oriented X3, non focal  CHEST/LUNG: bibasilar rales; No rhonchi, wheezing, or rubs  HEART: Regular rate and rhythm; No murmurs, rubs, or gallops  ABDOMEN: Soft, Nontender, anasarca; Bowel sounds present  EXTREMITIES:  2+ Peripheral Pulses,  +++edema b/l  SKIN: No rashes or lesions

## 2019-11-19 NOTE — ED PROVIDER NOTE - OBJECTIVE STATEMENT
73 y/o M w/extensive cardiac Hx including ICM w/EF 20%, CABG, and afib in addition to HTN and DM presents for several days of increasing SOB and weakness. As per family pt is non compliant with medications, and they do not know which medications he is supposed to take. Pt states he only takes daily tylenol, unsure of dose. SOB worsened w/exertion and lying flat. Pt states subjective fevers. Denies cough, syncope, abdominal pain, dysuria. 73 y/o M w/extensive cardiac Hx including ICM w/EF 20%, CABG, and afib in addition to HTN and DM presents for several days to weeks of increasing SOB and weakness. As per family pt is non compliant with medications, and they do not know which medications he is supposed to take. Likely has not taken meds in 2 weeks. Pt states he only takes daily tylenol, unsure of dose. SOB worsened w/exertion and lying flat. Pt states subjective fevers. Denies cough, syncope, abdominal pain, dysuria.

## 2019-11-19 NOTE — CONSULT NOTE ADULT - ASSESSMENT
73 y/o male PMH Afib s/p cardioversion 11/2018 (previously on coumadin), HTN, DM, HLD, CAD s/p 3 V CABG, Mitral Valve repair, MARY LOU clip, ICM EF <20%, NYHA IIIA, BoSci ICD admitted for CHF exacerbation  1. CHF exacerbation  -Temeletry  -Cardio consulted, recs appreciated.   -Start lisinopril 5mg daily, digoxin 0.125mg daily, coreg 3.125mg daily  - diurese Lasix 40Mg IV BID, Spironolactone 25mg PO daily  - Repeat TTE  - B/L LE venous doppler  - Strict i/o and daily standing weights (if possible).   - Monitor renal function with ongoing diuresis.    2. Atrial Fibrillation   - s/p cardioversion   - start lovenox BID for now, will need AC out patient (labs pending)  - Adiodarone    3. CAD s/p CABD  2- asa 81 daily  - statin    4. DM2 -  LDISS FSBA AC HS    5. DVT prophylaxis - LOVENOX BID

## 2019-11-19 NOTE — ED PROVIDER NOTE - PROGRESS NOTE DETAILS
Kayley GILBERT: pt seen by [facility] cards, lasix ordered. Pt with elevated bili and INR, concern for hepatic etiology for fluid overload +/- cardiac

## 2019-11-19 NOTE — ED ADULT NURSE REASSESSMENT NOTE - NS ED NURSE REASSESS COMMENT FT1
report received from off going RN, pt received A+Ox4, in no apparent distress. pt breathing even and unlabored. LEMUS well x4. pt remains on cardiac monitor and . pt denies any CP or SOB at this time. report received from off going RN, pt received A+Ox4, in no apparent distress. pt breathing even and unlabored. LEMUS well x4. pt remains on cardiac monitor and . pt denies any CP or SOB at this time. pt admitted, pending admit bed at this time. pt educated on POC, verbalized understanding. pt safety measures maintained.

## 2019-11-19 NOTE — ED ADULT NURSE NOTE - CHIEF COMPLAINT QUOTE
Pt with extensive cardiac history lives alone and has not been taking his medications for 3 weeks. Pt is noted to be edematous and is complaining of being weak without any appetite. Pt cardiologist is Juve.

## 2019-11-19 NOTE — ED PROVIDER NOTE - PHYSICAL EXAMINATION
Vital Signs: I have reviewed the initial vital signs.  Constitutional: appears stated age, no acute distress  Cardiovascular: regular rate, well-perfused extremities,   Respiratory: slight bibasilar crackles, speaking in complete sentences,   Eyes: PERRL, scleral icterus  Gastrointestinal: soft, non-tender abdomen, no pulsatile mass  Musculoskeletal: supple neck, bilateral 2+ edema in upper and lower extremities  Integumentary: warm, dry, no rash  Neurologic: awake, alert, cranial nerves II-XII grossly intact, extremities motor and sensory functions grossly intact  Psychiatric: appropriate mood, appropriate affect

## 2019-11-19 NOTE — ED ADULT TRIAGE NOTE - CHIEF COMPLAINT QUOTE
Pt with extensive cardiac history lives alone and has not been taking his medications for 3 weeks. Pt is noted to be edematous and is complaining of being weak without any appetite. Pt cardiologist is Juve. SEVERE

## 2019-11-19 NOTE — H&P ADULT - ASSESSMENT
75 y/o male PMH Afib s/p cardioversion 11/2018 (previously on coumadin), HTN, DM, HLD, CAD s/p 3 V CABG, Mitral Valve repair, MARY LOU clip, ICM EF <20%, NYHA IIIA, BoSci ICD presents to ED with c/o several days worsening SOB, dyspnea, edema. Pt non compliant with medications. pt states sometimes live in homeless center, as per ED Doc, family wanting to move him to [place] but pt insists on going back to [place]. Pt is poor historian, endorses shortness of breath, orthopnea, dyspnea on exertion, "swollen everywhere", diarrhea . Denies fever, chills, cough, phlegm production, chest pain, pressure, nausea, vomiting, abd pain or urinary complaints. Daughter called for further information as pt is a poor historian. As pr daughter pt has intermittent sporadic behavior where he stays in their rented apartment for sometime and then suddenly packs his bags and leaves to go somewhere else. This has become more frequent after the passing of her mom in 2004. Pt was a heavy smoker & an alcoholic in th past. No diagnosis of any psychiatric illness in the past.  As per pt he hasnt taken his meds for months & has not seen a doctor for years.         Acute on Chronic HFrEF  - non compliant with medications  - RANDOLPH 2018- EF < 20%, severely enlarged LA, severely reduced LV RV systolic function  - Start lisinopril 5mg daily, digoxin 0.125mg daily, coreg 3.125mg daily (pt was on entresto & amiodarone in the past)  - diurese Lasix 40Mg IV BID, Spironolactone 25mg PO daily  - Repeat TTE  - BoSci ICD, called for interrogation- no VT, AFib  - B/L LE venous doppler  - Monitor on telemetry.   - Strict i/o and daily standing weights (if possible).   - Keep K > 4, Mg > 2.   - Monitor renal function with ongoing diuresis.  Cardio appreciated        CAD s/p CABG  - asa 81 daily  - statin  - BB as above    Afib  - s/p cardioversion   - start lovenox BID for now, will need AC out patient (pt was on eliquis & amiodarone in the past)  - telemetry monitoring for now.     DM type 2- pt was on metformin & glimeperide in the past  Check A1c  f/u FS  ISS    HTN/ HLD- c/w BB, lisinopril, statin    DVT ppx: on lovenox

## 2019-11-19 NOTE — ED PROVIDER NOTE - NS ED ROS FT
Constitutional: (+) subjective fever (-) vomiting  Eyes/ENT: (-) vision changes, (-) hearing changes  Cardiovascular: (-) chest pain, (+) sob  Respiratory: (-) cough, (+) shortness of breath  Gastrointestinal: (-) vomiting, (-) diarrhea, (-) abdominal pain  : (-) dysuria   Musculoskeletal: (-) back pain  Integumentary: (-) rash, (+) edema  Neurological: (-)loc  Allergic/Immunologic: (-) pruritus  Endocrine: No history of thyroid disease or diabetes. Constitutional: (+) subjective fever (-) vomiting  Eyes/ENT: (-) vision changes, (-) hearing changes  Cardiovascular: (-) chest pain, (+) sob  Respiratory: (-) cough, (+) shortness of breath (+) WOLF (+) orthopnea  Gastrointestinal: (-) vomiting, (-) diarrhea, (-) abdominal pain  : (-) dysuria   Musculoskeletal: (-) back pain  Integumentary: (-) rash, (+) edema  Neurological: (-)loc  Allergic/Immunologic: (-) pruritus  Endocrine: No history of thyroid disease or diabetes.

## 2019-11-19 NOTE — ED PROVIDER NOTE - CLINICAL SUMMARY MEDICAL DECISION MAKING FREE TEXT BOX
73 y/o M w/extensive cardiac hx, DM, HTN presents w/ several days of increasing SOB and tiredness in the setting of medication non compliance. Likely due to fluid overload. Not in acute resp distress, able to speak in complete sentences. Will assess labs, EKG, CXR. Diurese once electrolytes are resulted. Reassess. 75 y/o M w/extensive cardiac hx, DM, HTN presents w/ several days of increasing SOB and tiredness in the setting of medication non compliance. Likely due to fluid overload. Not in acute resp distress, able to speak in complete sentences. Will assess labs, EKG, CXR. Diurese once electrolytes are resulted. Reassess. [place] cards consulted

## 2019-11-19 NOTE — H&P ADULT - NSICDXPASTMEDICALHX_GEN_ALL_CORE_FT
PAST MEDICAL HISTORY:  AF (atrial fibrillation)     CAD (coronary artery disease)     Cardiomyopathy     CHF (congestive heart failure) chronic systolic congestive heart failure    DM (diabetes mellitus)     Ejection fraction < 50% as per  echo  10-29-18   ef<20%    HTN (hypertension)     LBBB (left bundle branch block)     MR (mitral regurgitation)

## 2019-11-20 LAB
ALBUMIN SERPL ELPH-MCNC: 3.5 G/DL — SIGNIFICANT CHANGE UP (ref 3.3–5.2)
ALP SERPL-CCNC: 96 U/L — SIGNIFICANT CHANGE UP (ref 40–120)
ALT FLD-CCNC: 14 U/L — SIGNIFICANT CHANGE UP
ANION GAP SERPL CALC-SCNC: 15 MMOL/L — SIGNIFICANT CHANGE UP (ref 5–17)
AST SERPL-CCNC: 24 U/L — SIGNIFICANT CHANGE UP
BASOPHILS # BLD AUTO: 0.03 K/UL — SIGNIFICANT CHANGE UP (ref 0–0.2)
BASOPHILS NFR BLD AUTO: 0.8 % — SIGNIFICANT CHANGE UP (ref 0–2)
BILIRUB SERPL-MCNC: 2.5 MG/DL — HIGH (ref 0.4–2)
BUN SERPL-MCNC: 23 MG/DL — HIGH (ref 8–20)
CALCIUM SERPL-MCNC: 9.5 MG/DL — SIGNIFICANT CHANGE UP (ref 8.6–10.2)
CHLORIDE SERPL-SCNC: 102 MMOL/L — SIGNIFICANT CHANGE UP (ref 98–107)
CHOLEST SERPL-MCNC: 109 MG/DL — LOW (ref 110–199)
CK MB CFR SERPL CALC: 5.9 NG/ML — SIGNIFICANT CHANGE UP (ref 0–6.7)
CK SERPL-CCNC: 428 U/L — HIGH (ref 30–200)
CO2 SERPL-SCNC: 24 MMOL/L — SIGNIFICANT CHANGE UP (ref 22–29)
CREAT SERPL-MCNC: 1.35 MG/DL — HIGH (ref 0.5–1.3)
EOSINOPHIL # BLD AUTO: 0.05 K/UL — SIGNIFICANT CHANGE UP (ref 0–0.5)
EOSINOPHIL NFR BLD AUTO: 1.4 % — SIGNIFICANT CHANGE UP (ref 0–6)
GLUCOSE BLDC GLUCOMTR-MCNC: 114 MG/DL — HIGH (ref 70–99)
GLUCOSE BLDC GLUCOMTR-MCNC: 133 MG/DL — HIGH (ref 70–99)
GLUCOSE BLDC GLUCOMTR-MCNC: 165 MG/DL — HIGH (ref 70–99)
GLUCOSE SERPL-MCNC: 170 MG/DL — HIGH (ref 70–115)
HBA1C BLD-MCNC: 7.6 % — HIGH (ref 4–5.6)
HCT VFR BLD CALC: 39.6 % — SIGNIFICANT CHANGE UP (ref 39–50)
HDLC SERPL-MCNC: 69 MG/DL — SIGNIFICANT CHANGE UP
HGB BLD-MCNC: 12.4 G/DL — LOW (ref 13–17)
IMM GRANULOCYTES NFR BLD AUTO: 0.3 % — SIGNIFICANT CHANGE UP (ref 0–1.5)
LIPID PNL WITH DIRECT LDL SERPL: 29 MG/DL — SIGNIFICANT CHANGE UP
LYMPHOCYTES # BLD AUTO: 0.86 K/UL — LOW (ref 1–3.3)
LYMPHOCYTES # BLD AUTO: 23.3 % — SIGNIFICANT CHANGE UP (ref 13–44)
MAGNESIUM SERPL-MCNC: 1.5 MG/DL — LOW (ref 1.8–2.6)
MCHC RBC-ENTMCNC: 30.6 PG — SIGNIFICANT CHANGE UP (ref 27–34)
MCHC RBC-ENTMCNC: 31.3 GM/DL — LOW (ref 32–36)
MCV RBC AUTO: 97.8 FL — SIGNIFICANT CHANGE UP (ref 80–100)
MONOCYTES # BLD AUTO: 0.28 K/UL — SIGNIFICANT CHANGE UP (ref 0–0.9)
MONOCYTES NFR BLD AUTO: 7.6 % — SIGNIFICANT CHANGE UP (ref 2–14)
NEUTROPHILS # BLD AUTO: 2.46 K/UL — SIGNIFICANT CHANGE UP (ref 1.8–7.4)
NEUTROPHILS NFR BLD AUTO: 66.6 % — SIGNIFICANT CHANGE UP (ref 43–77)
PHOSPHATE SERPL-MCNC: 3 MG/DL — SIGNIFICANT CHANGE UP (ref 2.4–4.7)
PLATELET # BLD AUTO: 142 K/UL — LOW (ref 150–400)
POTASSIUM SERPL-MCNC: 4.4 MMOL/L — SIGNIFICANT CHANGE UP (ref 3.5–5.3)
POTASSIUM SERPL-SCNC: 4.4 MMOL/L — SIGNIFICANT CHANGE UP (ref 3.5–5.3)
PROT SERPL-MCNC: 7 G/DL — SIGNIFICANT CHANGE UP (ref 6.6–8.7)
RBC # BLD: 4.05 M/UL — LOW (ref 4.2–5.8)
RBC # FLD: 17.1 % — HIGH (ref 10.3–14.5)
SODIUM SERPL-SCNC: 141 MMOL/L — SIGNIFICANT CHANGE UP (ref 135–145)
TOTAL CHOLESTEROL/HDL RATIO MEASUREMENT: 2 RATIO — LOW (ref 3.4–9.6)
TRIGL SERPL-MCNC: 57 MG/DL — SIGNIFICANT CHANGE UP (ref 10–200)
TROPONIN T SERPL-MCNC: 0.03 NG/ML — SIGNIFICANT CHANGE UP (ref 0–0.06)
WBC # BLD: 3.69 K/UL — LOW (ref 3.8–10.5)
WBC # FLD AUTO: 3.69 K/UL — LOW (ref 3.8–10.5)

## 2019-11-20 PROCEDURE — 99232 SBSQ HOSP IP/OBS MODERATE 35: CPT

## 2019-11-20 PROCEDURE — 93306 TTE W/DOPPLER COMPLETE: CPT | Mod: 26

## 2019-11-20 RX ORDER — MAGNESIUM SULFATE 500 MG/ML
2 VIAL (ML) INJECTION EVERY 6 HOURS
Refills: 0 | Status: COMPLETED | OUTPATIENT
Start: 2019-11-20 | End: 2019-11-21

## 2019-11-20 RX ORDER — ACETAMINOPHEN 500 MG
650 TABLET ORAL EVERY 6 HOURS
Refills: 0 | Status: DISCONTINUED | OUTPATIENT
Start: 2019-11-20 | End: 2019-11-26

## 2019-11-20 RX ADMIN — Medication 40 MILLIGRAM(S): at 05:21

## 2019-11-20 RX ADMIN — AMIODARONE HYDROCHLORIDE 200 MILLIGRAM(S): 400 TABLET ORAL at 05:21

## 2019-11-20 RX ADMIN — Medication 650 MILLIGRAM(S): at 02:00

## 2019-11-20 RX ADMIN — ATORVASTATIN CALCIUM 20 MILLIGRAM(S): 80 TABLET, FILM COATED ORAL at 22:22

## 2019-11-20 RX ADMIN — Medication 50 GRAM(S): at 22:21

## 2019-11-20 RX ADMIN — ENOXAPARIN SODIUM 80 MILLIGRAM(S): 100 INJECTION SUBCUTANEOUS at 17:48

## 2019-11-20 RX ADMIN — LISINOPRIL 5 MILLIGRAM(S): 2.5 TABLET ORAL at 05:21

## 2019-11-20 RX ADMIN — CARVEDILOL PHOSPHATE 3.12 MILLIGRAM(S): 80 CAPSULE, EXTENDED RELEASE ORAL at 17:45

## 2019-11-20 RX ADMIN — Medication 81 MILLIGRAM(S): at 11:34

## 2019-11-20 RX ADMIN — Medication 1: at 11:34

## 2019-11-20 RX ADMIN — Medication 40 MILLIGRAM(S): at 17:45

## 2019-11-20 RX ADMIN — CARVEDILOL PHOSPHATE 3.12 MILLIGRAM(S): 80 CAPSULE, EXTENDED RELEASE ORAL at 05:21

## 2019-11-20 RX ADMIN — ENOXAPARIN SODIUM 80 MILLIGRAM(S): 100 INJECTION SUBCUTANEOUS at 05:21

## 2019-11-20 RX ADMIN — Medication 650 MILLIGRAM(S): at 01:04

## 2019-11-20 NOTE — PROGRESS NOTE ADULT - SUBJECTIVE AND OBJECTIVE BOX
CHIEF COMPLAINT/INTERVAL HISTORY:    Patient is a 74y old  Male who presents with a chief complaint of SOB, swelling (20 Nov 2019 18:33)      HPI:  73 y/o male PMH Afib s/p cardioversion 11/2018 (previously on coumadin), HTN, DM, HLD, CAD s/p 3 V CABG, Mitral Valve repair, MARY LOU clip, ICM EF <20%, NYHA IIIA, BoSci ICD presents to ED with c/o several days worsening SOB, dyspnea, edema. Pt non compliant with medications. pt states sometimes live in homeless center, as per ED Doc, family wanting to move him to [place] but pt insists on going back to [place]. Pt is poor historian, endorses shortness of breath, orthopnea, dyspnea on exertion, "swollen everywhere", diarrhea . Denies fever, chills, cough, phlegm production, chest pain, pressure, nausea, vomiting, abd pain or urinary complaints. Daughter called for further information as pt is a poor historian. As pr daughter pt has intermittent sporadic behavior where he stays in their rented apartment for sometime and then suddenly packs his bags and leaves to go somewhere else. This has become more frequent after the passing of her mom in 2004. Pt was a heavy smoker & an alcoholic in th past. No diagnosis of any psychiatric illness in the past.  As per pt he hasnt taken his meds for months & has not seen a doctor for years. (19 Nov 2019 19:15)      SUBJECTIVE & OBJECTIVE/ ROS: Pt seen and examined at bedside. No I & O's documented in the ED. Pt urinating multiple times as per him. No chest pain, palpitations, sob, light headedness/dizziness, difficulty breathing/cough, fevers/chills, abdominal pain, n/v, diarrhea/constipation, dysuria or increased urinary frequency.     ICU Vital Signs Last 24 Hrs  T(C): 36.8 (20 Nov 2019 19:43), Max: 37 (20 Nov 2019 07:10)  T(F): 98.3 (20 Nov 2019 19:43), Max: 98.6 (20 Nov 2019 07:10)  HR: 65 (20 Nov 2019 19:43) (56 - 68)  BP: 118/71 (20 Nov 2019 19:43) (113/76 - 148/82)  BP(mean): --  ABP: --  ABP(mean): --  RR: 18 (20 Nov 2019 19:43) (18 - 18)  SpO2: 100% (20 Nov 2019 19:43) (98% - 100%)        MEDICATIONS  (STANDING):  aMIOdarone    Tablet 200 milliGRAM(s) Oral daily  aspirin  chewable 81 milliGRAM(s) Oral daily  atorvastatin 20 milliGRAM(s) Oral at bedtime  carvedilol 3.125 milliGRAM(s) Oral every 12 hours  dextrose 5%. 1000 milliLiter(s) (50 mL/Hr) IV Continuous <Continuous>  dextrose 50% Injectable 12.5 Gram(s) IV Push once  enoxaparin Injectable 80 milliGRAM(s) SubCutaneous every 12 hours  furosemide   Injectable 40 milliGRAM(s) IV Push two times a day  insulin lispro (HumaLOG) corrective regimen sliding scale   SubCutaneous three times a day before meals  lisinopril 5 milliGRAM(s) Oral daily  magnesium sulfate  IVPB 2 Gram(s) IV Intermittent every 6 hours    MEDICATIONS  (PRN):  acetaminophen   Tablet .. 650 milliGRAM(s) Oral every 6 hours PRN Mild Pain (1 - 3)  dextrose 40% Gel 15 Gram(s) Oral once PRN Blood Glucose LESS THAN 70 milliGRAM(s)/deciliter  glucagon  Injectable 1 milliGRAM(s) IntraMuscular once PRN Glucose LESS THAN 70 milligrams/deciliter      LABS:                        12.4   3.69  )-----------( 142      ( 20 Nov 2019 09:34 )             39.6     11-20    141  |  102  |  23.0<H>  ----------------------------<  170<H>  4.4   |  24.0  |  1.35<H>    Ca    9.5      20 Nov 2019 09:34  Phos  3.0     11-20  Mg     1.5     11-20    TPro  7.0  /  Alb  3.5  /  TBili  2.5<H>  /  DBili  x   /  AST  24  /  ALT  14  /  AlkPhos  96  11-20    PT/INR - ( 19 Nov 2019 15:11 )   PT: 16.9 sec;   INR: 1.45 ratio         PTT - ( 19 Nov 2019 15:11 )  PTT:33.3 sec      CAPILLARY BLOOD GLUCOSE      POCT Blood Glucose.: 133 mg/dL (20 Nov 2019 17:36)  POCT Blood Glucose.: 165 mg/dL (20 Nov 2019 11:30)  POCT Blood Glucose.: 114 mg/dL (20 Nov 2019 07:53)      RECENT CULTURES:      RADIOLOGY & ADDITIONAL TESTS:      PHYSICAL EXAM:    GENERAL: NAD, well-groomed, well-developed  HEAD:  Atraumatic, Normocephalic  EYES: EOMI, PERRLA, conjunctiva and sclera clear  ENMT: Moist mucous membranes  NECK: Supple, No JVD  NERVOUS SYSTEM:  Alert & Oriented X3, Motor Strength 5/5 B/L upper and lower extremities; DTRs 2+ intact and symmetric  CHEST/LUNG: Clear to auscultation bilaterally; No rales, rhonchi, wheezing, or rubs  HEART: Regular rate and rhythm; No murmurs, rubs, or gallops  ABDOMEN: Soft, Nontender, Nondistended; Bowel sounds present  EXTREMITIES:  2+ Peripheral Pulses, +++ edema b/l

## 2019-11-20 NOTE — PROGRESS NOTE ADULT - ASSESSMENT
73 y/o male PMH Afib s/p cardioversion 11/2018 (previously on coumadin), HTN, DM, HLD, CAD s/p 3 V CABG, Mitral Valve repair, MARY LOU clip, ICM EF <20%, NYHA IIIA, BoSci ICD presents to ED with c/o several days worsening SOB, dyspnea, edema. Pt non compliant with medications. pt states sometimes live in homeless center, as per ED Doc, family wanting to move him to [place] but pt insists on going back to [place]. Pt is poor historian, endorses shortness of breath, orthopnea, dyspnea on exertion, "swollen everywhere", diarrhea . Denies fever, chills, cough, phlegm production, chest pain, pressure, nausea, vomiting, abd pain or urinary complaints. Daughter called for further information as pt is a poor historian. As pr daughter pt has intermittent sporadic behavior where he stays in their rented apartment for sometime and then suddenly packs his bags and leaves to go somewhere else. This has become more frequent after the passing of her mom in 2004. Pt was a heavy smoker & an alcoholic in th past. No diagnosis of any psychiatric illness in the past.  As per pt he hasnt taken his meds for months & has not seen a doctor for years.         Acute on Chronic HFrEF  - non compliant with medications  - RANDOLPH 2018- EF < 20%, severely enlarged LA, severely reduced LV RV systolic function  - Start lisinopril 5mg daily, digoxin 0.125mg daily, coreg 3.125mg daily (pt was on entresto & amiodarone in the past)  - diurese Lasix 40Mg IV BID, Spironolactone 25mg PO daily  - Repeat TTE: EF < 20%, Mod -severe TR, mod PHTN  - BoSci ICD, called for interrogation- no VT, AFib  - B/L LE venous doppler -ve for DVT  - Monitor on telemetry.   - Strict i/o and daily standing weights (if possible).   - Keep K > 4, Mg > 2.   - Monitor renal function with ongoing diuresis. Bump in creatinine noted. Will hold lisinopril for now  Cardio appreciated        CAD s/p CABG  - asa 81 daily  - statin  - BB as above    Afib  - s/p cardioversion   - start lovenox BID for now, will need AC out patient (pt was on eliquis & amiodarone in the past)  - telemetry monitoring for now.     DM type 2- pt was on metformin & glimeperide in the past  Check A1c  f/u FS  ISS    HTN/ HLD- c/w BB, hold lisinopril, c/w statin    DVT ppx: on lovenox 73 y/o male PMH Afib s/p cardioversion 11/2018 (previously on coumadin), HTN, DM, HLD, CAD s/p 3 V CABG, Mitral Valve repair, MARY LOU clip, ICM EF <20%, NYHA IIIA, BoSci ICD presents to ED with c/o several days worsening SOB, dyspnea, edema. Pt non compliant with medications. pt states sometimes live in homeless center, as per ED Doc, family wanting to move him to [place] but pt insists on going back to [place]. Pt is poor historian, endorses shortness of breath, orthopnea, dyspnea on exertion, "swollen everywhere", diarrhea . Denies fever, chills, cough, phlegm production, chest pain, pressure, nausea, vomiting, abd pain or urinary complaints. Daughter called for further information as pt is a poor historian. As pr daughter pt has intermittent sporadic behavior where he stays in their rented apartment for sometime and then suddenly packs his bags and leaves to go somewhere else. This has become more frequent after the passing of her mom in 2004. Pt was a heavy smoker & an alcoholic in th past. No diagnosis of any psychiatric illness in the past.  As per pt he hasnt taken his meds for months & has not seen a doctor for years.         Acute on Chronic HFrEF  - non compliant with medications  - RANDOLPH 2018- EF < 20%, severely enlarged LA, severely reduced LV RV systolic function  - c/w digoxin 0.125mg daily, coreg 3.125mg daily (pt was on entresto & amiodarone in the past)  - hold Lasix 40Mg IV BID & lisinopril 5mg daily for now due to rising CR   Spironolactone 25mg PO daily  - Repeat TTE: EF < 20%, Mod -severe TR, mod PHTN  - BoSci ICD, called for interrogation- no VT, AFib  - B/L LE venous doppler -ve for DVT  - Monitor on telemetry.   - Strict i/o and daily standing weights (if possible).   - Keep K > 4, Mg > 2.   - Monitor renal function with ongoing diuresis.  Cardio follow up      DEV- likely from diuresis  Bump in creatinine noted. Will hold lisinopril & lasix for now  Hold lisinopril PO  Hold lasix IV  Change lovenox to heparin gtt if GFR < 30  f/u BMP          CAD s/p CABG  - asa 81 daily  - statin  - BB as above    Afib  - s/p cardioversion   - GFR 51 c/w lovenox BID for now. Change lovenox to heparin gtt if GFR < 30   will need AC out patient (pt was on eliquis & amiodarone in the past)  - telemetry monitoring for now.     DM type 2- pt was on metformin & glimeperide in the past  Check A1c  f/u FS  ISS    HTN/ HLD- c/w BB, hold lisinopril, c/w statin    DVT ppx: on lovenox

## 2019-11-21 LAB
AMPHET UR-MCNC: NEGATIVE — SIGNIFICANT CHANGE UP
ANION GAP SERPL CALC-SCNC: 10 MMOL/L — SIGNIFICANT CHANGE UP (ref 5–17)
APPEARANCE UR: CLEAR — SIGNIFICANT CHANGE UP
BARBITURATES UR SCN-MCNC: NEGATIVE — SIGNIFICANT CHANGE UP
BASOPHILS # BLD AUTO: 0.03 K/UL — SIGNIFICANT CHANGE UP (ref 0–0.2)
BASOPHILS NFR BLD AUTO: 0.7 % — SIGNIFICANT CHANGE UP (ref 0–2)
BENZODIAZ UR-MCNC: NEGATIVE — SIGNIFICANT CHANGE UP
BILIRUB UR-MCNC: NEGATIVE — SIGNIFICANT CHANGE UP
BUN SERPL-MCNC: 29 MG/DL — HIGH (ref 8–20)
CALCIUM SERPL-MCNC: 9.1 MG/DL — SIGNIFICANT CHANGE UP (ref 8.6–10.2)
CHLORIDE SERPL-SCNC: 102 MMOL/L — SIGNIFICANT CHANGE UP (ref 98–107)
CO2 SERPL-SCNC: 29 MMOL/L — SIGNIFICANT CHANGE UP (ref 22–29)
COCAINE METAB.OTHER UR-MCNC: NEGATIVE — SIGNIFICANT CHANGE UP
COLOR SPEC: YELLOW — SIGNIFICANT CHANGE UP
CREAT SERPL-MCNC: 1.46 MG/DL — HIGH (ref 0.5–1.3)
DIFF PNL FLD: NEGATIVE — SIGNIFICANT CHANGE UP
EOSINOPHIL # BLD AUTO: 0.05 K/UL — SIGNIFICANT CHANGE UP (ref 0–0.5)
EOSINOPHIL NFR BLD AUTO: 1.2 % — SIGNIFICANT CHANGE UP (ref 0–6)
GLUCOSE BLDC GLUCOMTR-MCNC: 127 MG/DL — HIGH (ref 70–99)
GLUCOSE BLDC GLUCOMTR-MCNC: 134 MG/DL — HIGH (ref 70–99)
GLUCOSE BLDC GLUCOMTR-MCNC: 144 MG/DL — HIGH (ref 70–99)
GLUCOSE BLDC GLUCOMTR-MCNC: 152 MG/DL — HIGH (ref 70–99)
GLUCOSE SERPL-MCNC: 169 MG/DL — HIGH (ref 70–115)
GLUCOSE UR QL: NEGATIVE MG/DL — SIGNIFICANT CHANGE UP
HCT VFR BLD CALC: 36.7 % — LOW (ref 39–50)
HGB BLD-MCNC: 11.5 G/DL — LOW (ref 13–17)
IMM GRANULOCYTES NFR BLD AUTO: 0.5 % — SIGNIFICANT CHANGE UP (ref 0–1.5)
KETONES UR-MCNC: NEGATIVE — SIGNIFICANT CHANGE UP
LEUKOCYTE ESTERASE UR-ACNC: NEGATIVE — SIGNIFICANT CHANGE UP
LYMPHOCYTES # BLD AUTO: 0.72 K/UL — LOW (ref 1–3.3)
LYMPHOCYTES # BLD AUTO: 17.5 % — SIGNIFICANT CHANGE UP (ref 13–44)
MAGNESIUM SERPL-MCNC: 2.3 MG/DL — SIGNIFICANT CHANGE UP (ref 1.6–2.6)
MCHC RBC-ENTMCNC: 30.7 PG — SIGNIFICANT CHANGE UP (ref 27–34)
MCHC RBC-ENTMCNC: 31.3 GM/DL — LOW (ref 32–36)
MCV RBC AUTO: 97.9 FL — SIGNIFICANT CHANGE UP (ref 80–100)
METHADONE UR-MCNC: NEGATIVE — SIGNIFICANT CHANGE UP
MONOCYTES # BLD AUTO: 0.31 K/UL — SIGNIFICANT CHANGE UP (ref 0–0.9)
MONOCYTES NFR BLD AUTO: 7.5 % — SIGNIFICANT CHANGE UP (ref 2–14)
NEUTROPHILS # BLD AUTO: 2.99 K/UL — SIGNIFICANT CHANGE UP (ref 1.8–7.4)
NEUTROPHILS NFR BLD AUTO: 72.6 % — SIGNIFICANT CHANGE UP (ref 43–77)
NITRITE UR-MCNC: NEGATIVE — SIGNIFICANT CHANGE UP
OPIATES UR-MCNC: NEGATIVE — SIGNIFICANT CHANGE UP
PCP SPEC-MCNC: SIGNIFICANT CHANGE UP
PCP UR-MCNC: NEGATIVE — SIGNIFICANT CHANGE UP
PH UR: 6 — SIGNIFICANT CHANGE UP (ref 5–8)
PHOSPHATE SERPL-MCNC: 3.4 MG/DL — SIGNIFICANT CHANGE UP (ref 2.4–4.7)
PLATELET # BLD AUTO: 137 K/UL — LOW (ref 150–400)
POTASSIUM SERPL-MCNC: 4.1 MMOL/L — SIGNIFICANT CHANGE UP (ref 3.5–5.3)
POTASSIUM SERPL-SCNC: 4.1 MMOL/L — SIGNIFICANT CHANGE UP (ref 3.5–5.3)
PROT UR-MCNC: NEGATIVE MG/DL — SIGNIFICANT CHANGE UP
RBC # BLD: 3.75 M/UL — LOW (ref 4.2–5.8)
RBC # FLD: 16.9 % — HIGH (ref 10.3–14.5)
SODIUM SERPL-SCNC: 141 MMOL/L — SIGNIFICANT CHANGE UP (ref 135–145)
SP GR SPEC: 1.01 — SIGNIFICANT CHANGE UP (ref 1.01–1.02)
THC UR QL: NEGATIVE — SIGNIFICANT CHANGE UP
UROBILINOGEN FLD QL: NEGATIVE MG/DL — SIGNIFICANT CHANGE UP
WBC # BLD: 4.12 K/UL — SIGNIFICANT CHANGE UP (ref 3.8–10.5)
WBC # FLD AUTO: 4.12 K/UL — SIGNIFICANT CHANGE UP (ref 3.8–10.5)

## 2019-11-21 PROCEDURE — 99233 SBSQ HOSP IP/OBS HIGH 50: CPT

## 2019-11-21 RX ORDER — DIGOXIN 250 MCG
0.12 TABLET ORAL DAILY
Refills: 0 | Status: DISCONTINUED | OUTPATIENT
Start: 2019-11-21 | End: 2019-11-24

## 2019-11-21 RX ORDER — FUROSEMIDE 40 MG
40 TABLET ORAL
Refills: 0 | Status: DISCONTINUED | OUTPATIENT
Start: 2019-11-21 | End: 2019-11-25

## 2019-11-21 RX ORDER — SPIRONOLACTONE 25 MG/1
25 TABLET, FILM COATED ORAL DAILY
Refills: 0 | Status: DISCONTINUED | OUTPATIENT
Start: 2019-11-21 | End: 2019-11-26

## 2019-11-21 RX ADMIN — CARVEDILOL PHOSPHATE 3.12 MILLIGRAM(S): 80 CAPSULE, EXTENDED RELEASE ORAL at 17:23

## 2019-11-21 RX ADMIN — Medication 40 MILLIGRAM(S): at 17:23

## 2019-11-21 RX ADMIN — ENOXAPARIN SODIUM 80 MILLIGRAM(S): 100 INJECTION SUBCUTANEOUS at 22:32

## 2019-11-21 RX ADMIN — ATORVASTATIN CALCIUM 20 MILLIGRAM(S): 80 TABLET, FILM COATED ORAL at 22:32

## 2019-11-21 RX ADMIN — Medication 650 MILLIGRAM(S): at 22:31

## 2019-11-21 RX ADMIN — Medication 40 MILLIGRAM(S): at 06:33

## 2019-11-21 RX ADMIN — CARVEDILOL PHOSPHATE 3.12 MILLIGRAM(S): 80 CAPSULE, EXTENDED RELEASE ORAL at 06:33

## 2019-11-21 RX ADMIN — Medication 50 GRAM(S): at 03:55

## 2019-11-21 RX ADMIN — Medication 81 MILLIGRAM(S): at 09:04

## 2019-11-21 RX ADMIN — AMIODARONE HYDROCHLORIDE 200 MILLIGRAM(S): 400 TABLET ORAL at 06:33

## 2019-11-21 RX ADMIN — Medication 650 MILLIGRAM(S): at 23:31

## 2019-11-21 RX ADMIN — Medication 50 GRAM(S): at 09:10

## 2019-11-21 RX ADMIN — ENOXAPARIN SODIUM 80 MILLIGRAM(S): 100 INJECTION SUBCUTANEOUS at 09:04

## 2019-11-21 NOTE — PROGRESS NOTE ADULT - ASSESSMENT
75 y/o male PMH Afib s/p cardioversion 11/2018 (previously on coumadin), HTN, DM, HLD, CAD s/p 3 V CABG, Mitral Valve repair, MARY LOU clip, ICM EF <20%, NYHA IIIA, BoSci ICD presents to ED with c/o several days worsening SOB, dyspnea, edema. Pt non compliant with medications. pt states sometimes live in homeless center, as per ED Doc, family wanting to move him to [place] but pt insists on going back to [place]. Pt is poor historian, endorses shortness of breath, orthopnea, dyspnea on exertion, "swollen everywhere", diarrhea.    Acute on Chronic HFrEF  - non adherent with medications  - RANDOLPH 2018- EF < 20%, severely enlarged LA, severely reduced LV RV systolic function  - start digoxin 0.125mg daily no load   - continue coreg 3.125mg,   - reduce lisinopril to 2.5mg daily   - diurese Lasix 40Mg IV BID, Spironolactone 25mg PO daily  - Repeat TTE - EF <20% mod RV systolic function, mod-sev TR, mild MR, mod pulm HTN, no pericardial effusion.  - BoSci ICD, called for interrogation- no VT, AFib, Heart logic threshold >20 two weeks ago, today almost 0. thoracic impedence 16. eval in chart.   - B/L LE venous doppler - no DVT but pulsatile waveforms attributed to volume overload.   - Monitor on telemetry.   - Strict i/o and daily standing weights ONLY.   - Keep K > 4, Mg > 2.   - Monitor renal function with ongoing diuresis.    CAD s/p CABG  - asa 81 daily  - statin  - BB as above    Afib  - s/p cardioversion   - start lovenox BID for now, will need AC out patient (labs pending)    DEV  - mild increase in creatinine during diuresis  - will monitor   - for now continue diuresis/lisinopril

## 2019-11-21 NOTE — PROGRESS NOTE ADULT - SUBJECTIVE AND OBJECTIVE BOX
[facility]                          [address] [address]                       Phone: [phone]. Fax:[phone]                      ________________________________________________           Reason for follow up/Overnight events: follow up, still very volume overloaded on exam. SOB resolved    HPI:  75 y/o male PMH Afib s/p cardioversion 2018 (previously on coumadin), HTN, DM, HLD, CAD s/p 3 V CABG, Mitral Valve repair, MARY LOU clip, ICM EF <20%, NYHA IIIA, BoSci ICD presents to ED with c/o several days worsening SOB, dyspnea, edema. Pt non compliant with medications. pt states sometimes live in homeless center, as per ED Doc, family wanting to move him to [place] but pt insists on going back to [place]. Pt is poor historian, endorses shortness of breath, orthopnea, dyspnea on exertion, "swollen everywhere", diarrhea . Denies fever, chills, cough, phlegm production, chest pain, pressure, nausea, vomiting, abd pain or urinary complaints. Daughter called for further information as pt is a poor historian. As pr daughter pt has intermittent sporadic behavior where he stays in their rented apartment for sometime and then suddenly packs his bags and leaves to go somewhere else. This has become more frequent after the passing of her mom in . Pt was a heavy smoker & an alcoholic in th past. No diagnosis of any psychiatric illness in the past.  As per pt he hasnt taken his meds for months & has not seen a doctor for years. (2019 19:15)      ROS: All review of systems negative unless indicated otherwise below.                          LAB RESULTS                   COMPLETE BLOOD COUNT( 2019 07:09 )                            11.5 g/dL<L>  4.12 K/uL )---------------( 137 K/uL<L>                        36.7 %<L>      Automated Differential     Auto Basophil # - 0.03 K/uL  Auto Basophil % - 0.7 %  Auto Eosinophil # - 0.05 K/uL  Auto Eosinophil % - 1.2 %  Auto Immature Granulocyte # - X      Auto Immature Granulocyte % - 0.5 %  Auto Lymphocyte # - 0.72 K/uL<L>  Auto Lymphocyte % - 17.5 %  Auto Monocyte # - 0.31 K/uL  Auto Monocyte % - 7.5 %  Auto Neutrophil # - 2.99 K/uL  Auto Neutrophil % - 72.6 %                                  CHEMISTRY                 Basic Metabolic Panel (19 @ 07:09)    141  |  102  |  29.0<H>  ----------------------------<  169<H>  4.1   |  29.0  |  1.46<H>    Ca    9.1      2019 07:09  Phos  3.4       Mg     2.3                         Liver Functions (19 @ 09:34))  TPro  7.0  /  Alb  3.5  /  TBili  2.5  /  DBili  x   /  AST  24  /  ALT  14  /  AlkPhos  96     PT/INR/PTT ( 2019 15:11 )                        :                       :      16.9         :       33.3                  .        .                   .              .           .       1.45        .                                                                   Cardiac Enzymes   ( 2019 02:07 )  Troponin T  0.03 ,  CPK  428<H>, CKMB  X    , BNP X        , ( 2019 15:14 )  Troponin T  X    ,  CPK  X    , CKMB  X    , BNP 49351<H>    , ( 2019 15:11 )  Troponin T  0.02 ,  CPK  X    , CKMB  X    , BNP X                              RADIOLOGY RESULTS: Personally visualized   < from: Xray Chest 2 Views PA/Lat (19 @ 14:37) >  Left ICD in situ. Gross cardiomegaly similar to prior. Atherosclerotic   aorta. Status post median sternotomy cardiac valve surgery. No focal   infiltrate or pleural collection. Calcified granuloma right upper lobe   similar to prior.    Impression: As above    < end of copied text >                          CARDIOLOGY RESULTS: Official Report/Preliminary Verbal Reports    ECHO:   < from: TTE Echo Complete w/Doppler (19 @ 17:00) >    Summary:   1. Endocardial visualization was enhanced with intravenous echo contrast.   2. Severely decreased global left ventricular systolic function. Left   ventricular ejection fraction, by visual estimation, is <20%.   3. Moderately enlarged right ventricle. Moderately reduced RV systolic   function.   4. Moderate-severe tricuspid regurgitation.   5. Normally functioning mitral valve annuloplasty ring - no significant   stenosis, mild mitral regurgitation.   6. Mild to moderate aortic regurgitation.   7. Estimated pulmonary artery systolic pressure is 59.1 mmHg assuming a   right atrial pressure of 15 mmHg, which is consistent with moderate   pulmonary hypertension.   8. Dilated ascending aorta.   9. There is no evidence of pericardial effusion.    Jahaira Vasquez DO Electronically signed on 2019 at 6:33:29 PM    < end of copied text >    CATH:   < from: Cardiac Cath Lab - Adult (16 @ 16:48) >  VENTRICLES: No LV gram was performed; however, a recent echocardiogram  demonstrated an EF of 20 %.  CORONARY VESSELS: The coronary circulation is right dominant.  LM:   --  LM: Angiography showed minor luminal irregularities with no flow  limiting lesions.  LAD:   --  LAD: There was a 80 % stenosis in the proximal third of the  vessel segment.  --  Mid LAD: There was a 80 % stenosis.  --  Distal LAD: There was a 70 % stenosis.  CX:   --  Circumflex: Angiography showed mild atherosclerosis with no flow  limiting lesions.  --  OM1: There was a 70 % stenosis in the middle third of the vessel  segment.  RI:   --  Ramus intermedius: There was a 90 % stenosis in the middle third  of the vessel segment.  RCA:   --  Distal RCA: There was a 70 % stenosis.  COMPLICATIONS: No complications occurred during the cath lab visit.  DIAGNOSTIC IMPRESSIONS: Multi-vessel disease including long diffuse LAD  disease in the setting of severe cardiomyopathy. Mildly elevated LVEDP.  DIAGNOSTIC RECOMMENDATIONS: CT surgery evaluation for possible CABG.  INTERVENTIONAL IMPRESSIONS: Multi-vessel disease including long diffuse LAD  disease in the setting of severe cardiomyopathy. Mildly elevated LVEDP.  INTERVENTIONAL RECOMMENDATIONS: CT surgery evaluation for possible CABG.  Prepared and signed by  Dada Lozano DO    < end of copied text >                        CARDIOLOGY REVIEW: Personally visualized and reviewed    Telemetry Last 24h:  60s                              DAILY WEIGHTS - 48 HOUR TREND     Daily Weight in k.3 (2019 04:38)                             INTAKE AND OUTPUT - 48 HOUR TREND     19 @ 07:01  -  19 @ 07:00  --------------------------------------------------------  IN:  Total IN: 0 mL    OUT:    Voided: 830 mL  Total OUT: 830 mL    Total NET: -830 mL    HOME MEDICATIONS:  aspirin 81 mg oral delayed release tablet: 1 tab(s) orally once a day (2018 08:14)  atorvastatin 20 mg oral tablet: 1 tab(s) orally once a day (2018 08:14)  carvedilol 12.5 mg oral tablet: 1 tab(s) orally 2 times a day (2018 08:14)  Eliquis 5 mg oral tablet: 1 tab(s) orally 2 times a day (2018 08:14)  furosemide 40 mg oral tablet: 1 tab(s) orally once a day (2018 08:14)  glimepiride 1 mg oral tablet: 1 tab(s) orally once a day (2018 10:20)  metFORMIN 1000 mg oral tablet: 1 tab(s) orally 2 times a day (2018 10:20)                             Current Admission Active Medications    acetaminophen   Tablet .. 650 milliGRAM(s) Oral every 6 hours PRN Mild Pain (1 - 3)  aMIOdarone    Tablet 200 milliGRAM(s) Oral daily  aspirin  chewable 81 milliGRAM(s) Oral daily  atorvastatin 20 milliGRAM(s) Oral at bedtime  carvedilol 3.125 milliGRAM(s) Oral every 12 hours  dextrose 40% Gel 15 Gram(s) Oral once PRN Blood Glucose LESS THAN 70 milliGRAM(s)/deciliter  dextrose 5%. 1000 milliLiter(s) (50 mL/Hr) IV Continuous <Continuous>  dextrose 50% Injectable 12.5 Gram(s) IV Push once  enoxaparin Injectable 80 milliGRAM(s) SubCutaneous every 12 hours  furosemide   Injectable 40 milliGRAM(s) IV Push two times a day  glucagon  Injectable 1 milliGRAM(s) IntraMuscular once PRN Glucose LESS THAN 70 milligrams/deciliter  insulin lispro (HumaLOG) corrective regimen sliding scale   SubCutaneous three times a day before meals  metolazone 2.5 milliGRAM(s) Oral daily                        PHYSICAL EXAM:    Vital Signs Last 24 Hrs  T(C): 36.9 (2019 09:33), Max: 36.9 (2019 09:33)  T(F): 98.4 (2019 09:33), Max: 98.4 (2019 09:33)  HR: 59 (2019 09:33) (56 - 65)  BP: 121/79 (2019 09:33) (118/71 - 143/88)  BP(mean): --  RR: 20 (2019 09:33) (16 - 20)  SpO2: 100% (2019 09:) (96% - 100%)    GENERAL: NAD, volume overloaded  NECK: Supple, No JVD  NERVOUS SYSTEM:  Alert & Oriented X3, non focal neuro exam.   CHEST/LUNG: diminished lungs, clear to auscultation.   HEART: Regular rate and rhythm; s1 and s2 auscultated, No murmurs, rubs, or gallops  ABDOMEN: Soft, Nontender, Nondistended; Bowel sounds present and normoactive.   EXTREMITIES:  2+ Peripheral Pulses, No clubbing, cyanosis, +4 LE Edema bilaterally

## 2019-11-21 NOTE — PROGRESS NOTE ADULT - ASSESSMENT
75 y/o male PMH Afib s/p cardioversion 11/2018 (previously on coumadin), HTN, DM, HLD, CAD s/p 3 V CABG, Mitral Valve repair, MARY LOU clip, ICM EF <20%, NYHA IIIA, BoSci ICD presents to ED with c/o several days worsening SOB, dyspnea, edema. Pt non compliant with medications. pt states sometimes live in homeless center, as per ED Doc, family wanting to move him to [place] but pt insists on going back to [place]. Pt is poor historian, endorses shortness of breath, orthopnea, dyspnea on exertion, "swollen everywhere", diarrhea . Denies fever, chills, cough, phlegm production, chest pain, pressure, nausea, vomiting, abd pain or urinary complaints. Daughter called for further information as pt is a poor historian. As pr daughter pt has intermittent sporadic behavior where he stays in their rented apartment for sometime and then suddenly packs his bags and leaves to go somewhere else. This has become more frequent after the passing of her mom in 2004. Pt was a heavy smoker & an alcoholic in th past. No diagnosis of any psychiatric illness in the past.  As per pt he hasnt taken his meds for months & has not seen a doctor for years.         Acute on Chronic HFrEF  - non compliant with medications  - RANDOLPH 2018- EF < 20%, severely enlarged LA, severely reduced LV RV systolic function  - c/w digoxin 0.125mg daily, coreg 3.125mg daily (pt was on entresto & amiodarone in the past)  - hold Lasix 40Mg IV BID & lisinopril 5mg daily for now due to rising CR   Spironolactone 25mg PO daily  - Repeat TTE: EF < 20%, Mod -severe TR, mod PHTN  - BoSci ICD, called for interrogation- no VT, AFib  - B/L LE venous doppler -ve for DVT  - Monitor on telemetry.   - Strict i/o and daily standing weights (if possible).   - Keep K > 4, Mg > 2.   - Monitor renal function with ongoing diuresis.  Cardio follow up    DEV- likely prerenal cardio renal - restart LAsix today and restart lisinopril in AM   f/u BMP    CAD s/p CABG  - asa 81 daily  - statin  - BB as above    Afib  - s/p cardioversion   - GFR 51 c/w Lovenox BID for now. Change Lovenox to heparin gtt if GFR < 30   will need AC out patient (pt was on Eliquis & amiodarone in the past)  - telemetry monitoring for now.     DM type 2- pt was on metformin & Glimepiride in the past  A1c  FS with ISS    HTN/ HLD- c/w BB, hold lisinopril, c/w statin    DVT ppx: on Lovenox

## 2019-11-21 NOTE — PROGRESS NOTE ADULT - SUBJECTIVE AND OBJECTIVE BOX
DANIKA IRAHETA Patient is a 74y old  Male who presents with a chief complaint of SOB, swelling (2019 18:33)     HPI:  73 y/o male PMH Afib s/p cardioversion 2018 (previously on coumadin), HTN, DM, HLD, CAD s/p 3 V CABG, Mitral Valve repair, MRAY LOU clip, ICM EF <20%, NYHA IIIA, BoSci ICD presents to ED with c/o several days worsening SOB, dyspnea, edema. Pt non compliant with medications. pt states sometimes live in homeless center, as per ED Doc, family wanting to move him to [place] but pt insists on going back to [place]. Pt is poor historian, endorses shortness of breath, orthopnea, dyspnea on exertion, "swollen everywhere", diarrhea . Denies fever, chills, cough, phlegm production, chest pain, pressure, nausea, vomiting, abd pain or urinary complaints. Daughter called for further information as pt is a poor historian. As pr daughter pt has intermittent sporadic behavior where he stays in their rented apartment for sometime and then suddenly packs his bags and leaves to go somewhere else. This has become more frequent after the passing of her mom in . Pt was a heavy smoker & an alcoholic in th past. No diagnosis of any psychiatric illness in the past.  As per pt he hasnt taken his meds for months & has not seen a doctor for years. (2019 19:15)    The patient was seen and evaluated - states he doest now why he stopped his medications   The patient is in no acute distress.  Denied any fever chest pain, palpitations, shortness of breath, abdominal pain, fever, dysuria, cough, edema       Height (cm): 177.8 ( @ 04:38)I&O's Summary    2019 07:  -  2019 07:00  --------------------------------------------------------  IN: 530 mL / OUT: 830 mL / NET: -300 mL    2019 07:  -  2019 12:56  --------------------------------------------------------  IN: 260 mL / OUT: 650 mL / NET: -390 mL      Allergies    No Known Allergies    Intolerances      HEALTH ISSUES - PROBLEM Dx:        PAST MEDICAL & SURGICAL HISTORY:  Cardiomyopathy  AF (atrial fibrillation)  MR (mitral regurgitation)  DM (diabetes mellitus)  CAD (coronary artery disease)  Ejection fraction < 50%: as per  echo  10-29-18   ef&lt;20%  LBBB (left bundle branch block)  CHF (congestive heart failure): chronic systolic congestive heart failure  HTN (hypertension)  Status post mitral valve repair  S/P CABG x 3          Vital Signs Last 24 Hrs  T(C): 36.9 (2019 09:33), Max: 36.9 (2019 09:33)  T(F): 98.4 (2019 09:33), Max: 98.4 (2019 09:33)  HR: 59 (2019 09:33) (56 - 65)  BP: 121/79 (2019 09:33) (118/71 - 143/88)  BP(mean): --  RR: 20 (2019 09:33) (16 - 20)  SpO2: 100% (2019 09:33) (96% - 100%)T(C): 36.9 (19 @ 09:33), Max: 36.9 (19 @ 09:33)  HR: 59 (19 @ 09:33) (56 - 65)  BP: 121/79 (19 @ 09:33) (118/71 - 143/88)  RR: 20 (19 @ 09:33) (16 - 20)  SpO2: 100% (19 @ 09:33) (96% - 100%)  Wt(kg): --    PHYSICAL EXAM:    GENERAL: NAD, waked with me to sit in the chair by the window without support   HEAD:  Atraumatic, Normocephalic  EYES: EOMI, conjunctiva and sclera clear  ENMT:  Moist mucous membranes,  No lesions  NERVOUS SYSTEM:  Alert & Oriented,  Moves upper and lower extremities; CNS-II-XII  CHEST/LUNG: Clear to auscultation bilaterally; No rales, rhonchi, wheezing,   HEART: Regular rate and rhythm; No murmurs,   ABDOMEN: Soft, Nontender, Nondistended; Bowel sounds present  EXTREMITIES:  Peripheral edema  psychiatry- mood and affect approprite    acetaminophen   Tablet .. 650 milliGRAM(s) Oral every 6 hours PRN  aMIOdarone    Tablet 200 milliGRAM(s) Oral daily  aspirin  chewable 81 milliGRAM(s) Oral daily  atorvastatin 20 milliGRAM(s) Oral at bedtime  carvedilol 3.125 milliGRAM(s) Oral every 12 hours  dextrose 40% Gel 15 Gram(s) Oral once PRN  dextrose 5%. 1000 milliLiter(s) IV Continuous <Continuous>  dextrose 50% Injectable 12.5 Gram(s) IV Push once  enoxaparin Injectable 80 milliGRAM(s) SubCutaneous every 12 hours  glucagon  Injectable 1 milliGRAM(s) IntraMuscular once PRN  insulin lispro (HumaLOG) corrective regimen sliding scale   SubCutaneous three times a day before meals      LABS:                          11.5   4.12  )-----------( 137      ( 2019 07:09 )             36.7     -    141  |  102  |  29.0<H>  ----------------------------<  169<H>  4.1   |  29.0  |  1.46<H>    Ca    9.1      2019 07:09  Phos  3.4     -  Mg     2.3     -    TPro  7.0  /  Alb  3.5  /  TBili  2.5<H>  /  DBili  x   /  AST  24  /  ALT  14  /  AlkPhos  96  11-20    LIVER FUNCTIONS - ( 2019 09:34 )  Alb: 3.5 g/dL / Pro: 7.0 g/dL / ALK PHOS: 96 U/L / ALT: 14 U/L / AST: 24 U/L / GGT: x           PT/INR - ( 2019 15:11 )   PT: 16.9 sec;   INR: 1.45 ratio         PTT - ( 2019 15:11 )  PTT:33.3 sec  CARDIAC MARKERS ( 2019 02:07 )  x     / 0.03 ng/mL / 428 U/L / x     / 5.9 ng/mL  CARDIAC MARKERS ( 2019 15:11 )  x     / 0.02 ng/mL / x     / x     / x          Urinalysis Basic - ( 2019 00:43 )    Color: Yellow / Appearance: Clear / S.010 / pH: x  Gluc: x / Ketone: Negative  / Bili: Negative / Urobili: Negative mg/dL   Blood: x / Protein: Negative mg/dL / Nitrite: Negative   Leuk Esterase: Negative / RBC: x / WBC x   Sq Epi: x / Non Sq Epi: x / Bacteria: x      CAPILLARY BLOOD GLUCOSE      POCT Blood Glucose.: 144 mg/dL (2019 12:06)  POCT Blood Glucose.: 127 mg/dL (2019 08:07)  POCT Blood Glucose.: 133 mg/dL (2019 17:36)      RADIOLOGY & ADDITIONAL TESTS:      Consultant notes reviewed    Case discussed with consultant/provider/ family /patient

## 2019-11-22 LAB
ALBUMIN SERPL ELPH-MCNC: 3.2 G/DL — LOW (ref 3.3–5.2)
ALP SERPL-CCNC: 86 U/L — SIGNIFICANT CHANGE UP (ref 40–120)
ALT FLD-CCNC: 12 U/L — SIGNIFICANT CHANGE UP
ANION GAP SERPL CALC-SCNC: 14 MMOL/L — SIGNIFICANT CHANGE UP (ref 5–17)
AST SERPL-CCNC: 20 U/L — SIGNIFICANT CHANGE UP
BASOPHILS # BLD AUTO: 0.02 K/UL — SIGNIFICANT CHANGE UP (ref 0–0.2)
BASOPHILS NFR BLD AUTO: 0.6 % — SIGNIFICANT CHANGE UP (ref 0–2)
BILIRUB SERPL-MCNC: 1.9 MG/DL — SIGNIFICANT CHANGE UP (ref 0.4–2)
BUN SERPL-MCNC: 24 MG/DL — HIGH (ref 8–20)
CALCIUM SERPL-MCNC: 9.3 MG/DL — SIGNIFICANT CHANGE UP (ref 8.6–10.2)
CHLORIDE SERPL-SCNC: 99 MMOL/L — SIGNIFICANT CHANGE UP (ref 98–107)
CO2 SERPL-SCNC: 28 MMOL/L — SIGNIFICANT CHANGE UP (ref 22–29)
CREAT SERPL-MCNC: 1.39 MG/DL — HIGH (ref 0.5–1.3)
EOSINOPHIL # BLD AUTO: 0.07 K/UL — SIGNIFICANT CHANGE UP (ref 0–0.5)
EOSINOPHIL NFR BLD AUTO: 2.1 % — SIGNIFICANT CHANGE UP (ref 0–6)
GLUCOSE BLDC GLUCOMTR-MCNC: 115 MG/DL — HIGH (ref 70–99)
GLUCOSE BLDC GLUCOMTR-MCNC: 128 MG/DL — HIGH (ref 70–99)
GLUCOSE BLDC GLUCOMTR-MCNC: 147 MG/DL — HIGH (ref 70–99)
GLUCOSE BLDC GLUCOMTR-MCNC: 153 MG/DL — HIGH (ref 70–99)
GLUCOSE SERPL-MCNC: 120 MG/DL — HIGH (ref 70–115)
HCT VFR BLD CALC: 34.3 % — LOW (ref 39–50)
HGB BLD-MCNC: 11 G/DL — LOW (ref 13–17)
IMM GRANULOCYTES NFR BLD AUTO: 0.3 % — SIGNIFICANT CHANGE UP (ref 0–1.5)
LYMPHOCYTES # BLD AUTO: 0.82 K/UL — LOW (ref 1–3.3)
LYMPHOCYTES # BLD AUTO: 24 % — SIGNIFICANT CHANGE UP (ref 13–44)
MAGNESIUM SERPL-MCNC: 1.8 MG/DL — SIGNIFICANT CHANGE UP (ref 1.8–2.6)
MCHC RBC-ENTMCNC: 30.6 PG — SIGNIFICANT CHANGE UP (ref 27–34)
MCHC RBC-ENTMCNC: 32.1 GM/DL — SIGNIFICANT CHANGE UP (ref 32–36)
MCV RBC AUTO: 95.3 FL — SIGNIFICANT CHANGE UP (ref 80–100)
MONOCYTES # BLD AUTO: 0.34 K/UL — SIGNIFICANT CHANGE UP (ref 0–0.9)
MONOCYTES NFR BLD AUTO: 10 % — SIGNIFICANT CHANGE UP (ref 2–14)
NEUTROPHILS # BLD AUTO: 2.15 K/UL — SIGNIFICANT CHANGE UP (ref 1.8–7.4)
NEUTROPHILS NFR BLD AUTO: 63 % — SIGNIFICANT CHANGE UP (ref 43–77)
PHOSPHATE SERPL-MCNC: 4 MG/DL — SIGNIFICANT CHANGE UP (ref 2.4–4.7)
PLATELET # BLD AUTO: 135 K/UL — LOW (ref 150–400)
POTASSIUM SERPL-MCNC: 3.3 MMOL/L — LOW (ref 3.5–5.3)
POTASSIUM SERPL-SCNC: 3.3 MMOL/L — LOW (ref 3.5–5.3)
PROT SERPL-MCNC: 6.2 G/DL — LOW (ref 6.6–8.7)
RBC # BLD: 3.6 M/UL — LOW (ref 4.2–5.8)
RBC # FLD: 16.9 % — HIGH (ref 10.3–14.5)
SODIUM SERPL-SCNC: 141 MMOL/L — SIGNIFICANT CHANGE UP (ref 135–145)
WBC # BLD: 3.41 K/UL — LOW (ref 3.8–10.5)
WBC # FLD AUTO: 3.41 K/UL — LOW (ref 3.8–10.5)

## 2019-11-22 PROCEDURE — 99233 SBSQ HOSP IP/OBS HIGH 50: CPT

## 2019-11-22 RX ORDER — MAGNESIUM OXIDE 400 MG ORAL TABLET 241.3 MG
400 TABLET ORAL DAILY
Refills: 0 | Status: COMPLETED | OUTPATIENT
Start: 2019-11-22 | End: 2019-11-25

## 2019-11-22 RX ORDER — POTASSIUM CHLORIDE 20 MEQ
40 PACKET (EA) ORAL EVERY 4 HOURS
Refills: 0 | Status: COMPLETED | OUTPATIENT
Start: 2019-11-22 | End: 2019-11-22

## 2019-11-22 RX ORDER — POTASSIUM CHLORIDE 20 MEQ
40 PACKET (EA) ORAL ONCE
Refills: 0 | Status: COMPLETED | OUTPATIENT
Start: 2019-11-22 | End: 2019-11-22

## 2019-11-22 RX ADMIN — Medication 650 MILLIGRAM(S): at 19:14

## 2019-11-22 RX ADMIN — Medication 40 MILLIEQUIVALENT(S): at 16:15

## 2019-11-22 RX ADMIN — MAGNESIUM OXIDE 400 MG ORAL TABLET 400 MILLIGRAM(S): 241.3 TABLET ORAL at 09:44

## 2019-11-22 RX ADMIN — CARVEDILOL PHOSPHATE 3.12 MILLIGRAM(S): 80 CAPSULE, EXTENDED RELEASE ORAL at 06:20

## 2019-11-22 RX ADMIN — Medication 40 MILLIGRAM(S): at 06:21

## 2019-11-22 RX ADMIN — ATORVASTATIN CALCIUM 20 MILLIGRAM(S): 80 TABLET, FILM COATED ORAL at 22:49

## 2019-11-22 RX ADMIN — ENOXAPARIN SODIUM 80 MILLIGRAM(S): 100 INJECTION SUBCUTANEOUS at 22:49

## 2019-11-22 RX ADMIN — Medication 40 MILLIEQUIVALENT(S): at 09:40

## 2019-11-22 RX ADMIN — CARVEDILOL PHOSPHATE 3.12 MILLIGRAM(S): 80 CAPSULE, EXTENDED RELEASE ORAL at 17:09

## 2019-11-22 RX ADMIN — ENOXAPARIN SODIUM 80 MILLIGRAM(S): 100 INJECTION SUBCUTANEOUS at 09:40

## 2019-11-22 RX ADMIN — Medication 0.12 MILLIGRAM(S): at 06:20

## 2019-11-22 RX ADMIN — AMIODARONE HYDROCHLORIDE 200 MILLIGRAM(S): 400 TABLET ORAL at 06:20

## 2019-11-22 RX ADMIN — Medication 81 MILLIGRAM(S): at 09:40

## 2019-11-22 RX ADMIN — Medication 650 MILLIGRAM(S): at 20:15

## 2019-11-22 RX ADMIN — Medication 40 MILLIGRAM(S): at 17:09

## 2019-11-22 RX ADMIN — SPIRONOLACTONE 25 MILLIGRAM(S): 25 TABLET, FILM COATED ORAL at 06:20

## 2019-11-22 RX ADMIN — Medication 40 MILLIEQUIVALENT(S): at 19:14

## 2019-11-22 NOTE — PROGRESS NOTE ADULT - SUBJECTIVE AND OBJECTIVE BOX
[place] [facility]                                                        Office: [address] [address]                                                       Telephone: [phone]. Fax:[phone]                                                                             PROGRESS NOTE   Reason for follow up:  congestive heart failure and atrial fibrillation                             Overnight: No new events.   Update:  increase diuresis .  low potassium,     Subjective: "  i evelyne ok"   Complains of:  no new symptoms.   Review of symptoms: Cardiac:  No chest pain. No dyspnea. No palpitations.  Respiratory:no cough. No dyspnea  Gastrointestinal: No diarrhea. No abdominal pain. No bleeding.     Past medical history: No updates.   Chronic conditions:  Hypertension: controlled. CHF: improivng. . CAD: Stable ischemic heart disease. DM: Stable;    	  Vitals:  T(C): 36.4 (11-22-19 @ 06:16), Max: 36.8 (11-21-19 @ 15:40)  HR: 65 (11-22-19 @ 06:16) (60 - 65)  BP: 112/58 (11-22-19 @ 06:16) (112/58 - 116/73)  RR: 16 (11-22-19 @ 06:16) (16 - 18)  SpO2: 96% (11-22-19 @ 06:16) (96% - 98%)  Wt(kg): --  I&O's Summary    21 Nov 2019 07:01  -  22 Nov 2019 07:00  --------------------------------------------------------  IN: 800 mL / OUT: 1900 mL / NET: -1100 mL      Weight (kg): 77.1 (11-19 @ 12:31)    PHYSICAL EXAM:  Appearance: Comfortable. No acute distress  HEENT:  Head and neck: Atraumatic. Normocephalic.  Normal oral mucosa, PERRL, Neck is supple. No JVD, No carotid bruit.   Neurologic: A & O x 3, no focal deficits. EOMI , Cranial nerves are intact.  Lymphatic: No cervical lymphadenopathy  Cardiovascular: Normal S1 S2, No murmur, rubs/gallops. No JVD, No edema  Respiratory: bilateral decrease breath sounds. and crepts.   Gastrointestinal:  Soft, Non-tender, + BS  Lower Extremities: 1+  edema  Psychiatry: Patient is calm. No agitation. Mood & affect appropriate  Skin: No rashes/ ecchymoses/cyanosis/ulcers visualized on the face, hands or feet.    CURRENT MEDICATIONS:  aMIOdarone    Tablet 200 milliGRAM(s) Oral daily  carvedilol 3.125 milliGRAM(s) Oral every 12 hours  digoxin     Tablet 0.125 milliGRAM(s) Oral daily  furosemide   Injectable 40 milliGRAM(s) IV Push two times a day  spironolactone 25 milliGRAM(s) Oral daily    atorvastatin  dextrose 50% Injectable  insulin lispro (HumaLOG) corrective regimen sliding scale  aspirin  chewable  dextrose 5%.  enoxaparin Injectable  magnesium oxide  potassium chloride    Tablet ER      LABS:	 	  CARDIAC MARKERS ( 20 Nov 2019 02:07 )  x     / 0.03 ng/mL / 428 U/L / x     / 5.9 ng/mL  p-BNP 20 Nov 2019 02:07: x    , CARDIAC MARKERS ( 19 Nov 2019 15:14 )  x     / x     / x     / x     / x      p-BNP 19 Nov 2019 15:14: 51804 pg/mL, CARDIAC MARKERS ( 19 Nov 2019 15:11 )  x     / 0.02 ng/mL / x     / x     / x      p-BNP 19 Nov 2019 15:11: x                                11.0   3.41  )-----------( 135      ( 22 Nov 2019 06:17 )             34.3     11-22    141  |  99  |  24.0<H>  ----------------------------<  120<H>  3.3<L>   |  28.0  |  1.39<H>    Ca    9.3      22 Nov 2019 06:17  Phos  4.0     11-22  Mg     1.8     11-22    TPro  6.2<L>  /  Alb  3.2<L>  /  TBili  1.9  /  DBili  x   /  AST  20  /  ALT  12  /  AlkPhos  86  11-22    proBNP: Serum Pro-Brain Natriuretic Peptide: 85247 pg/mL (11-19 @ 15:14)    Lipid Profile: Date: 11-20 @ 09:34  Total cholesterol 109; Direct LDL: 29; HDL: 69; Triglycerides:57    HgA1c: Hemoglobin A1C, Whole Blood: 7.6 %  TSH:     TELEMETRY: Reviewed    ECG:  Reviewed by me. 	< from: 12 Lead ECG (11.19.19 @ 15:44) >    Diagnosis Line *** Suspect unspecified pacemaker failure  Ventricular-paced rhythm  Abnormal ECG    < end of copied text >      DIAGNOSTIC TESTING:  [ ] Echocardiogram:  Moderate RV dysfunction. LVEF <20%.

## 2019-11-22 NOTE — CDI QUERY NOTE - NSCDIOTHERTXTBX_GEN_ALL_CORE_HH
H&P- PMH Afib s/p cardioversion 11/2018 (previously on coumadin),    Afib  - s/p cardioversion   - start lovenox BID for now, will need AC out patient (pt was on eliquis & amiodarone in the past)  - telemetry monitoring for now.     Please specify type of AFib    1) Chronic Persistent AF  2) Chronic AF  3) Long standing persistent AF  4) Paroxysmal AF  5) Unknown

## 2019-11-22 NOTE — PROGRESS NOTE ADULT - ASSESSMENT
73 y/o male PMH Afib s/p cardioversion 11/2018 (previously on coumadin), HTN, DM, HLD, CAD s/p 3 V CABG, Mitral Valve repair, MARY LOU clip, ICM EF <20%, NYHA IIIA, BoSci ICD presents to ED with c/o several days worsening SOB, dyspnea, edema. Pt non compliant with medications. pt states sometimes live in homeless center, as per ED Doc, family wanting to move him to [place] but pt insists on going back to [place]. Pt is poor historian, endorses shortness of breath, orthopnea, dyspnea on exertion, "swollen everywhere", diarrhea . Denies fever, chills, cough, phlegm production, chest pain, pressure, nausea, vomiting, abd pain or urinary complaints. Daughter called for further information as pt is a poor historian. As pr daughter pt has intermittent sporadic behavior where he stays in their rented apartment for sometime and then suddenly packs his bags and leaves to go somewhere else. This has become more frequent after the passing of her mom in 2004. Pt was a heavy smoker & an alcoholic in th past. No diagnosis of any psychiatric illness in the past.  As per pt he hasnt taken his meds for months & has not seen a doctor for years.     Acute on Chronic HFrEF  - non compliant with medications  - RANDOLPH 2018- EF < 20%, severely enlarged LA, severely reduced LV RV systolic function  - c/w digoxin 0.125mg daily, coreg 3.125mg daily (pt was on entresto & amiodarone in the past)  - on lasix 40mg IV BID   Spironolactone 25mg PO daily  - Repeat TTE: EF < 20%, Mod -severe TR, mod PHTN  - BoSci ICD, called for interrogation- no VT, AFib  - B/L LE venous doppler -ve for DVT  - Monitor on telemetry.   - Strict i/o and daily standing weights (if possible).   - Keep K > 4, Mg > 2.   - Monitor renal function with ongoing diuresis.  - Cardio follow up    DEV- likely prerenal cardio renal  - f/u BMP  - continue to monitor    CAD s/p CABG  - asa 81 daily  - statin  - BB as above    Afib  - s/p cardioversion   - GFR 51 c/w Lovenox BID for now. Change Lovenox to heparin gtt if GFR < 30   will need AC out patient (pt was on Eliquis & amiodarone in the past)  - telemetry monitoring for now.     DM type 2- pt was on metformin & Glimepiride in the past  A1c  FS with ISS    HTN/ HLD- c/w BB, hold lisinopril, c/w statin    DVT ppx: on Lovenox    DISPO: patient will need more diuresis; likely transition to oral in 48-72 hours. Patient daughter is looking for an apartment. CM following. 73 y/o male PMH Afib s/p cardioversion 11/2018 (previously on coumadin), HTN, DM, HLD, CAD s/p 3 V CABG, Mitral Valve repair, MARY LOU clip, ICM EF <20%, NYHA IIIA, BoSci ICD presents to ED with c/o several days worsening SOB, dyspnea, edema. Pt non compliant with medications. pt states sometimes live in homeless center, as per ED Doc, family wanting to move him to [place] but pt insists on going back to [place]. Pt is poor historian, endorses shortness of breath, orthopnea, dyspnea on exertion, "swollen everywhere", diarrhea . Denies fever, chills, cough, phlegm production, chest pain, pressure, nausea, vomiting, abd pain or urinary complaints. Daughter called for further information as pt is a poor historian. As pr daughter pt has intermittent sporadic behavior where he stays in their rented apartment for sometime and then suddenly packs his bags and leaves to go somewhere else. This has become more frequent after the passing of her mom in 2004. Pt was a heavy smoker & an alcoholic in th past. No diagnosis of any psychiatric illness in the past.  As per pt he hasnt taken his meds for months & has not seen a doctor for years.     Acute on Chronic HFrEF  - non compliant with medications  - RANDOLPH 2018- EF < 20%, severely enlarged LA, severely reduced LV RV systolic function  - c/w digoxin 0.125mg daily, coreg 3.125mg daily (pt was on entresto & amiodarone in the past)  - on lasix 40mg IV BID   Spironolactone 25mg PO daily  - Repeat TTE: EF < 20%, Mod -severe TR, mod PHTN  - BoSci ICD, called for interrogation- no VT, AFib  - B/L LE venous doppler -ve for DVT  - Monitor on telemetry.   - Strict i/o and daily standing weights (if possible).   - Keep K > 4, Mg > 2.   - Monitor renal function with ongoing diuresis.  - Cardio follow up    DEV- likely prerenal cardio renal  - f/u BMP  - continue to monitor    CAD s/p CABG  - asa 81 daily  - statin  - BB as above    paroxysmal Afib  - s/p cardioversion   - GFR 51 c/w Lovenox BID for now. Change Lovenox to heparin gtt if GFR < 30   will need AC out patient (pt was on Eliquis & amiodarone in the past)  - telemetry monitoring for now.     DM type 2- pt was on metformin & Glimepiride in the past  A1c  FS with ISS    HTN/ HLD- c/w BB, hold lisinopril, c/w statin    DVT ppx: on Lovenox    DISPO: patient will need more diuresis; likely transition to oral in 48-72 hours. Patient daughter is looking for an apartment. CM following.

## 2019-11-22 NOTE — PROGRESS NOTE ADULT - SUBJECTIVE AND OBJECTIVE BOX
CC: SOB, swelling (2019 10:02)    INTERVAL HPI/OVERNIGHT EVENTS:    Vital Signs Last 24 Hrs  T(C): 36.4 (2019 10:54), Max: 36.8 (2019 15:40)  T(F): 97.6 (2019 10:54), Max: 98.3 (2019 15:40)  HR: 60 (2019 10:54) (60 - 65)  BP: 110/60 (2019 10:54) (110/60 - 116/73)  BP(mean): --  RR: 18 (2019 10:54) (16 - 18)  SpO2: 98% (2019 10:54) (96% - 98%)    PHYSICAL EXAM:  General: unkept; in no acute distress  Head: Normocephalic; atraumatic  ENMT: No nasal discharge; airway clear  Neck: Supple; non tender; no masses  Respiratory: No wheezes, rales or rhonchi  Cardiovascular: Regular rate and rhythm. S1 and S2 Normal; No murmurs, gallops or rubs  Gastrointestinal: Soft non-tender non-distended; Normal bowel sounds  Genitourinary: No costovertebral angle tenderness  Extremities: Normal range of motion, No clubbing, cyanosis; edema pitting +3 up to the knees including in the upper extremities L>R  Neurological: Alert and oriented x4  Skin: Warm and dry. No acute rash  Psychiatric: Cooperative and appropriate    I&O's Detail    2019 07:01  -  2019 07:00  --------------------------------------------------------  IN:    Oral Fluid: 800 mL  Total IN: 800 mL    OUT:    Voided: 1900 mL  Total OUT: 1900 mL    Total NET: -1100 mL                        11.0   3.41  )-----------( 135      ( 2019 06:17 )             34.3     2019 06:17    141    |  99     |  24.0   ----------------------------<  120    3.3     |  28.0   |  1.39     Ca    9.3        2019 06:17  Phos  4.0       2019 06:17  Mg     1.8       2019 06:17    TPro  6.2    /  Alb  3.2    /  TBili  1.9    /  DBili  x      /  AST  20     /  ALT  12     /  AlkPhos  86     2019 06:17    CAPILLARY BLOOD GLUCOSE  POCT Blood Glucose.: 128 mg/dL (2019 12:10)  POCT Blood Glucose.: 115 mg/dL (2019 08:04)  POCT Blood Glucose.: 152 mg/dL (2019 21:44)  POCT Blood Glucose.: 134 mg/dL (2019 16:59)    LIVER FUNCTIONS - ( 2019 06:17 )  Alb: 3.2 g/dL / Pro: 6.2 g/dL / ALK PHOS: 86 U/L / ALT: 12 U/L / AST: 20 U/L / GGT: x           Urinalysis Basic - ( 2019 00:43 )    Color: Yellow / Appearance: Clear / S.010 / pH: x  Gluc: x / Ketone: Negative  / Bili: Negative / Urobili: Negative mg/dL   Blood: x / Protein: Negative mg/dL / Nitrite: Negative   Leuk Esterase: Negative / RBC: x / WBC x   Sq Epi: x / Non Sq Epi: x / Bacteria: x    Hemoglobin A1C, Whole Blood: 7.6 % (19 @ 09:34)    MEDICATIONS  (STANDING):  aMIOdarone    Tablet 200 milliGRAM(s) Oral daily  aspirin  chewable 81 milliGRAM(s) Oral daily  atorvastatin 20 milliGRAM(s) Oral at bedtime  carvedilol 3.125 milliGRAM(s) Oral every 12 hours  dextrose 5%. 1000 milliLiter(s) (50 mL/Hr) IV Continuous <Continuous>  dextrose 50% Injectable 12.5 Gram(s) IV Push once  digoxin     Tablet 0.125 milliGRAM(s) Oral daily  enoxaparin Injectable 80 milliGRAM(s) SubCutaneous every 12 hours  furosemide   Injectable 40 milliGRAM(s) IV Push two times a day  insulin lispro (HumaLOG) corrective regimen sliding scale   SubCutaneous three times a day before meals  magnesium oxide 400 milliGRAM(s) Oral daily  potassium chloride    Tablet ER 40 milliEquivalent(s) Oral every 4 hours  spironolactone 25 milliGRAM(s) Oral daily    MEDICATIONS  (PRN):  acetaminophen   Tablet .. 650 milliGRAM(s) Oral every 6 hours PRN Mild Pain (1 - 3)  dextrose 40% Gel 15 Gram(s) Oral once PRN Blood Glucose LESS THAN 70 milliGRAM(s)/deciliter  glucagon  Injectable 1 milliGRAM(s) IntraMuscular once PRN Glucose LESS THAN 70 milligrams/deciliter    RADIOLOGY & ADDITIONAL TESTS: CC: SOB, swelling (2019 10:02)    INTERVAL HPI/OVERNIGHT EVENTS:  patient without acute events  he is alert and oriented  denies alcohol  lives with a friend in [place] who supports him with food and lodging  patient however intermittently lives at homeless shelter  was advised to come to ED at his daughter request  she is looking for housing for him here in [place]  patient at this time ambulating well, denies shortness of breath, chest pain    Vital Signs Last 24 Hrs  T(C): 36.4 (2019 10:54), Max: 36.8 (2019 15:40)  T(F): 97.6 (2019 10:54), Max: 98.3 (2019 15:40)  HR: 60 (2019 10:54) (60 - 65)  BP: 110/60 (2019 10:54) (110/60 - 116/73)  BP(mean): --  RR: 18 (2019 10:54) (16 - 18)  SpO2: 98% (2019 10:54) (96% - 98%)    PHYSICAL EXAM:  General: unkept; in no acute distress  Head: Normocephalic; atraumatic  ENMT: No nasal discharge; airway clear  Neck: Supple; non tender; no masses  Respiratory: No wheezes, rales or rhonchi  Cardiovascular: Regular rate and rhythm. S1 and S2 Normal; No murmurs, gallops or rubs  Gastrointestinal: Soft non-tender non-distended; Normal bowel sounds  Genitourinary: No costovertebral angle tenderness  Extremities: Normal range of motion, No clubbing, cyanosis; edema pitting +3 up to the knees including in the upper extremities L>R  Neurological: Alert and oriented x4  Skin: Warm and dry. No acute rash  Psychiatric: Cooperative and appropriate    I&O's Detail    2019 07:01  -  2019 07:00  --------------------------------------------------------  IN:    Oral Fluid: 800 mL  Total IN: 800 mL    OUT:    Voided: 1900 mL  Total OUT: 1900 mL    Total NET: -1100 mL                        11.0   3.41  )-----------( 135      ( 2019 06:17 )             34.3     2019 06:17    141    |  99     |  24.0   ----------------------------<  120    3.3     |  28.0   |  1.39     Ca    9.3        2019 06:17  Phos  4.0       2019 06:17  Mg     1.8       2019 06:17    TPro  6.2    /  Alb  3.2    /  TBili  1.9    /  DBili  x      /  AST  20     /  ALT  12     /  AlkPhos  86     2019 06:17    CAPILLARY BLOOD GLUCOSE  POCT Blood Glucose.: 128 mg/dL (2019 12:10)  POCT Blood Glucose.: 115 mg/dL (2019 08:04)  POCT Blood Glucose.: 152 mg/dL (2019 21:44)  POCT Blood Glucose.: 134 mg/dL (2019 16:59)    LIVER FUNCTIONS - ( 2019 06:17 )  Alb: 3.2 g/dL / Pro: 6.2 g/dL / ALK PHOS: 86 U/L / ALT: 12 U/L / AST: 20 U/L / GGT: x           Urinalysis Basic - ( 2019 00:43 )    Color: Yellow / Appearance: Clear / S.010 / pH: x  Gluc: x / Ketone: Negative  / Bili: Negative / Urobili: Negative mg/dL   Blood: x / Protein: Negative mg/dL / Nitrite: Negative   Leuk Esterase: Negative / RBC: x / WBC x   Sq Epi: x / Non Sq Epi: x / Bacteria: x    Hemoglobin A1C, Whole Blood: 7.6 % (19 @ 09:34)    MEDICATIONS  (STANDING):  aMIOdarone    Tablet 200 milliGRAM(s) Oral daily  aspirin  chewable 81 milliGRAM(s) Oral daily  atorvastatin 20 milliGRAM(s) Oral at bedtime  carvedilol 3.125 milliGRAM(s) Oral every 12 hours  dextrose 5%. 1000 milliLiter(s) (50 mL/Hr) IV Continuous <Continuous>  dextrose 50% Injectable 12.5 Gram(s) IV Push once  digoxin     Tablet 0.125 milliGRAM(s) Oral daily  enoxaparin Injectable 80 milliGRAM(s) SubCutaneous every 12 hours  furosemide   Injectable 40 milliGRAM(s) IV Push two times a day  insulin lispro (HumaLOG) corrective regimen sliding scale   SubCutaneous three times a day before meals  magnesium oxide 400 milliGRAM(s) Oral daily  potassium chloride    Tablet ER 40 milliEquivalent(s) Oral every 4 hours  spironolactone 25 milliGRAM(s) Oral daily    MEDICATIONS  (PRN):  acetaminophen   Tablet .. 650 milliGRAM(s) Oral every 6 hours PRN Mild Pain (1 - 3)  dextrose 40% Gel 15 Gram(s) Oral once PRN Blood Glucose LESS THAN 70 milliGRAM(s)/deciliter  glucagon  Injectable 1 milliGRAM(s) IntraMuscular once PRN Glucose LESS THAN 70 milligrams/deciliter    RADIOLOGY & ADDITIONAL TESTS:

## 2019-11-22 NOTE — PROGRESS NOTE ADULT - ASSESSMENT
73 y/o male PMH Afib s/p cardioversion 11/2018 (previously on coumadin), HTN, DM, HLD, CAD s/p 3 V CABG, Mitral Valve repair, MARY LOU clip, ICM EF <20%, NYHA IIIA, BoSci ICD presents to ED with c/o several days worsening SOB, dyspnea, edema. Pt non compliant with medications. pt states sometimes live in homeless center, as per ED Doc, family wanting to move him to [place] but pt insists on going back to [place]. Pt is poor historian, endorses shortness of breath, orthopnea, dyspnea on exertion, "swollen everywhere", diarrhea.    Acute on Chronic HFrEF  - non adherent with medications  - RANDOLPH 2018- EF < 20%, severely enlarged LA, severely reduced LV RV systolic function  - ct lisinopril 5mg daily, digoxin 0.125mg daily, coreg 3.125mg daily lasix 40 IV Q12.  metolazone OPRN.  [place] adn mag repletion.,    Spironolactone 25mg PO daily    Atrial Fibrillation and other medical problems:  eliquis     CAD s/p CABD  - asa 81 daily  - statin- all labs pending  - BB as above    Afib  - s/p cardioversion   IN sinus. ct amiodarone . PFT and Opth check outpautent.   eliquis 5 mg Q12.

## 2019-11-23 DIAGNOSIS — I48.91 UNSPECIFIED ATRIAL FIBRILLATION: ICD-10-CM

## 2019-11-23 DIAGNOSIS — I50.9 HEART FAILURE, UNSPECIFIED: ICD-10-CM

## 2019-11-23 DIAGNOSIS — I25.10 ATHEROSCLEROTIC HEART DISEASE OF NATIVE CORONARY ARTERY WITHOUT ANGINA PECTORIS: ICD-10-CM

## 2019-11-23 DIAGNOSIS — I34.0 NONRHEUMATIC MITRAL (VALVE) INSUFFICIENCY: ICD-10-CM

## 2019-11-23 LAB
ANION GAP SERPL CALC-SCNC: 12 MMOL/L — SIGNIFICANT CHANGE UP (ref 5–17)
BASOPHILS # BLD AUTO: 0.03 K/UL — SIGNIFICANT CHANGE UP (ref 0–0.2)
BASOPHILS NFR BLD AUTO: 0.7 % — SIGNIFICANT CHANGE UP (ref 0–2)
BUN SERPL-MCNC: 33 MG/DL — HIGH (ref 8–20)
CALCIUM SERPL-MCNC: 9.8 MG/DL — SIGNIFICANT CHANGE UP (ref 8.6–10.2)
CHLORIDE SERPL-SCNC: 97 MMOL/L — LOW (ref 98–107)
CO2 SERPL-SCNC: 31 MMOL/L — HIGH (ref 22–29)
CREAT SERPL-MCNC: 1.49 MG/DL — HIGH (ref 0.5–1.3)
EOSINOPHIL # BLD AUTO: 0.07 K/UL — SIGNIFICANT CHANGE UP (ref 0–0.5)
EOSINOPHIL NFR BLD AUTO: 1.7 % — SIGNIFICANT CHANGE UP (ref 0–6)
GLUCOSE BLDC GLUCOMTR-MCNC: 118 MG/DL — HIGH (ref 70–99)
GLUCOSE BLDC GLUCOMTR-MCNC: 127 MG/DL — HIGH (ref 70–99)
GLUCOSE BLDC GLUCOMTR-MCNC: 131 MG/DL — HIGH (ref 70–99)
GLUCOSE BLDC GLUCOMTR-MCNC: 133 MG/DL — HIGH (ref 70–99)
GLUCOSE SERPL-MCNC: 149 MG/DL — HIGH (ref 70–115)
HCT VFR BLD CALC: 35.3 % — LOW (ref 39–50)
HGB BLD-MCNC: 11.4 G/DL — LOW (ref 13–17)
IMM GRANULOCYTES NFR BLD AUTO: 0.2 % — SIGNIFICANT CHANGE UP (ref 0–1.5)
LYMPHOCYTES # BLD AUTO: 0.96 K/UL — LOW (ref 1–3.3)
LYMPHOCYTES # BLD AUTO: 22.9 % — SIGNIFICANT CHANGE UP (ref 13–44)
MAGNESIUM SERPL-MCNC: 1.7 MG/DL — SIGNIFICANT CHANGE UP (ref 1.6–2.6)
MCHC RBC-ENTMCNC: 31 PG — SIGNIFICANT CHANGE UP (ref 27–34)
MCHC RBC-ENTMCNC: 32.3 GM/DL — SIGNIFICANT CHANGE UP (ref 32–36)
MCV RBC AUTO: 95.9 FL — SIGNIFICANT CHANGE UP (ref 80–100)
MONOCYTES # BLD AUTO: 0.43 K/UL — SIGNIFICANT CHANGE UP (ref 0–0.9)
MONOCYTES NFR BLD AUTO: 10.3 % — SIGNIFICANT CHANGE UP (ref 2–14)
NEUTROPHILS # BLD AUTO: 2.69 K/UL — SIGNIFICANT CHANGE UP (ref 1.8–7.4)
NEUTROPHILS NFR BLD AUTO: 64.2 % — SIGNIFICANT CHANGE UP (ref 43–77)
PHOSPHATE SERPL-MCNC: 4.1 MG/DL — SIGNIFICANT CHANGE UP (ref 2.4–4.7)
PLATELET # BLD AUTO: 151 K/UL — SIGNIFICANT CHANGE UP (ref 150–400)
POTASSIUM SERPL-MCNC: 4.3 MMOL/L — SIGNIFICANT CHANGE UP (ref 3.5–5.3)
POTASSIUM SERPL-SCNC: 4.3 MMOL/L — SIGNIFICANT CHANGE UP (ref 3.5–5.3)
RBC # BLD: 3.68 M/UL — LOW (ref 4.2–5.8)
RBC # FLD: 16.9 % — HIGH (ref 10.3–14.5)
SODIUM SERPL-SCNC: 140 MMOL/L — SIGNIFICANT CHANGE UP (ref 135–145)
WBC # BLD: 4.19 K/UL — SIGNIFICANT CHANGE UP (ref 3.8–10.5)
WBC # FLD AUTO: 4.19 K/UL — SIGNIFICANT CHANGE UP (ref 3.8–10.5)

## 2019-11-23 PROCEDURE — 99232 SBSQ HOSP IP/OBS MODERATE 35: CPT

## 2019-11-23 PROCEDURE — 99233 SBSQ HOSP IP/OBS HIGH 50: CPT

## 2019-11-23 RX ORDER — LOSARTAN POTASSIUM 100 MG/1
25 TABLET, FILM COATED ORAL DAILY
Refills: 0 | Status: DISCONTINUED | OUTPATIENT
Start: 2019-11-23 | End: 2019-11-26

## 2019-11-23 RX ORDER — POTASSIUM CHLORIDE 20 MEQ
40 PACKET (EA) ORAL ONCE
Refills: 0 | Status: DISCONTINUED | OUTPATIENT
Start: 2019-11-23 | End: 2019-11-23

## 2019-11-23 RX ADMIN — Medication 81 MILLIGRAM(S): at 12:44

## 2019-11-23 RX ADMIN — ATORVASTATIN CALCIUM 20 MILLIGRAM(S): 80 TABLET, FILM COATED ORAL at 20:55

## 2019-11-23 RX ADMIN — ENOXAPARIN SODIUM 80 MILLIGRAM(S): 100 INJECTION SUBCUTANEOUS at 21:01

## 2019-11-23 RX ADMIN — Medication 40 MILLIGRAM(S): at 17:48

## 2019-11-23 RX ADMIN — Medication 0.12 MILLIGRAM(S): at 05:27

## 2019-11-23 RX ADMIN — CARVEDILOL PHOSPHATE 3.12 MILLIGRAM(S): 80 CAPSULE, EXTENDED RELEASE ORAL at 17:48

## 2019-11-23 RX ADMIN — SPIRONOLACTONE 25 MILLIGRAM(S): 25 TABLET, FILM COATED ORAL at 05:27

## 2019-11-23 RX ADMIN — Medication 40 MILLIGRAM(S): at 05:27

## 2019-11-23 RX ADMIN — MAGNESIUM OXIDE 400 MG ORAL TABLET 400 MILLIGRAM(S): 241.3 TABLET ORAL at 12:44

## 2019-11-23 RX ADMIN — Medication 650 MILLIGRAM(S): at 17:48

## 2019-11-23 RX ADMIN — CARVEDILOL PHOSPHATE 3.12 MILLIGRAM(S): 80 CAPSULE, EXTENDED RELEASE ORAL at 05:27

## 2019-11-23 RX ADMIN — Medication 650 MILLIGRAM(S): at 16:53

## 2019-11-23 RX ADMIN — ENOXAPARIN SODIUM 80 MILLIGRAM(S): 100 INJECTION SUBCUTANEOUS at 10:22

## 2019-11-23 RX ADMIN — AMIODARONE HYDROCHLORIDE 200 MILLIGRAM(S): 400 TABLET ORAL at 05:27

## 2019-11-23 NOTE — PROGRESS NOTE ADULT - PROBLEM SELECTOR PLAN 1
Low sodium diet  Cardiomyopahty EF 20%  Lasix spirolactone  good urine out put  supplement k  creat stable 1.4 k low will start arb

## 2019-11-23 NOTE — PROGRESS NOTE ADULT - ASSESSMENT
73 y/o male PMH Afib s/p cardioversion 11/2018 (previously on coumadin), HTN, DM, HLD, CAD s/p 3 V CABG, Mitral Valve repair, MARY LOU clip, ICM EF <20%, NYHA IIIA, BoSci ICD presents to ED with c/o several days worsening SOB, dyspnea, edema. Pt non compliant with medications. pt states sometimes live in homeless center, as per ED Doc, family wanting to move him to [place] but pt insists on going back to [place]. Pt is poor historian, endorses shortness of breath, orthopnea, dyspnea on exertion, "swollen everywhere", diarrhea . Denies fever, chills, cough, phlegm production, chest pain, pressure, nausea, vomiting, abd pain or urinary complaints. Daughter called for further information as pt is a poor historian. As pr daughter pt has intermittent sporadic behavior where he stays in their rented apartment for sometime and then suddenly packs his bags and leaves to go somewhere else. This has become more frequent after the passing of her mom in 2004. Pt was a heavy smoker & an alcoholic in th past. No diagnosis of any psychiatric illness in the past.  As per pt he hasnt taken his meds for months & has not seen a doctor for years.     Acute on Chronic HFrEF  - non compliant with medications  - RANDOLPH 2018- EF < 20%, severely enlarged LA, severely reduced LV RV systolic function  - c/w digoxin 0.125mg daily, coreg 3.125mg daily (pt was on entresto & amiodarone in the past)  - on lasix 40mg IV BID   Spironolactone 25mg PO daily  - Repeat TTE: EF < 20%, Mod -severe TR, mod PHTN  - BoSci ICD, called for interrogation- no VT, AFib  - B/L LE venous doppler -ve for DVT  - Monitor on telemetry.   - Strict i/o and daily standing weights (if possible).   - Keep K > 4, Mg > 2.   - Monitor renal function with ongoing diuresis.  - Cardio follow up    DEV- likely prerenal cardio renal  - f/u BMP  - continue to monitor    CAD s/p CABG  - asa 81 daily  - statin  - BB as above    paroxysmal Afib  - s/p cardioversion   - GFR 51 c/w Lovenox BID for now. Change Lovenox to heparin gtt if GFR < 30   will need AC out patient (pt was on Eliquis & amiodarone in the past)  - telemetry monitoring for now.     DM type 2- pt was on metformin & Glimepiride in the past  A1c  FS with ISS    HTN/ HLD- c/w BB, hold lisinopril, c/w statin    DVT ppx: on Lovenox    DISPO: patient will need more diuresis; likely transition to oral in 48-72 hours. Patient daughter is looking for an apartment. CM following. 73 y/o male PMH Afib s/p cardioversion 11/2018 (previously on coumadin), HTN, DM, HLD, CAD s/p 3 V CABG, Mitral Valve repair, MARY LOU clip, ICM EF <20%, NYHA IIIA, BoSci ICD presents to ED with c/o several days worsening SOB, dyspnea, edema. Pt non compliant with medications. pt states sometimes live in homeless center, as per ED Doc, family wanting to move him to [place] but pt insists on going back to [place]. Pt is poor historian, endorses shortness of breath, orthopnea, dyspnea on exertion, "swollen everywhere", diarrhea . Denies fever, chills, cough, phlegm production, chest pain, pressure, nausea, vomiting, abd pain or urinary complaints. Daughter called for further information as pt is a poor historian. As pr daughter pt has intermittent sporadic behavior where he stays in their rented apartment for sometime and then suddenly packs his bags and leaves to go somewhere else. This has become more frequent after the passing of her mom in 2004. Pt was a heavy smoker & an alcoholic in th past. No diagnosis of any psychiatric illness in the past.  As per pt he hasnt taken his meds for months & has not seen a doctor for years.     Acute on Chronic HFrEF  - non compliant with medications  - RANDOLPH 2018- EF < 20%, severely enlarged LA, severely reduced LV RV systolic function  - c/w digoxin 0.125mg daily, coreg 3.125mg daily (pt was on entresto & amiodarone in the past)  - on lasix 40mg IV BID   Spironolactone 25mg PO daily  - Repeat TTE: EF < 20%, Mod -severe TR, mod PHTN  - BoSci ICD, called for interrogation- no VT, AFib  - B/L LE venous doppler -ve for DVT  - Monitor on telemetry.   - Strict i/o and daily weights  - Keep K > 4, Mg > 2.   - Monitor renal function with ongoing diuresis.  - Cardio follow up    DEV- likely prerenal cardio renal  - f/u BMP  - continue to monitor    CAD s/p CABG  - asa 81 daily  - statin  - BB as above    paroxysmal Afib  - s/p cardioversion   - GFR 51 c/w Lovenox BID for now. Change Lovenox to heparin gtt if GFR < 30   will need AC out patient (pt was on Eliquis & amiodarone in the past)  - telemetry monitoring for now.     DM type 2- pt was on metformin & Glimepiride in the past  A1c  FS with ISS    HTN/ HLD- c/w BB, hold lisinopril, c/w statin    DVT ppx: on Lovenox    DISPO: patient will need more diuresis; Reassess on Monday patient's fluid status. Patient daughter is looking for an apartment. CM following.

## 2019-11-23 NOTE — PROGRESS NOTE ADULT - SUBJECTIVE AND OBJECTIVE BOX
CC: SOB, swelling (2019 10:02)    INTERVAL HPI/OVERNIGHT EVENTS:    Vital Signs Last 24 Hrs  T(C): 36.4 (2019 05:24), Max: 36.7 (2019 22:46)  T(F): 97.5 (2019 05:24), Max: 98.1 (2019 22:46)  HR: 60 (2019 05:24) (60 - 60)  BP: 140/80 (2019 05:24) (103/69 - 140/80)  BP(mean): --  RR: 18 (2019 05:24) (18 - 18)  SpO2: 96% (2019 05:24) (96% - 98%)    PHYSICAL EXAM:  General: unkept; in no acute distress  Head: Normocephalic; atraumatic  ENMT: No nasal discharge; airway clear  Neck: Supple; non tender; no masses  Respiratory: No wheezes, rales or rhonchi  Cardiovascular: Regular rate and rhythm. S1 and S2 Normal; No murmurs, gallops or rubs  Gastrointestinal: Soft non-tender non-distended; Normal bowel sounds  Genitourinary: No costovertebral angle tenderness  Extremities: Normal range of motion, No clubbing, cyanosis; edema pitting +3 up to the knees including in the upper extremities L>R  Neurological: Alert and oriented x4  Skin: Warm and dry. No acute rash  Psychiatric: Cooperative and appropriate    I&O's Detail  -------------------------------------------------------  IN:  Total IN: 1910 mL  Total OUT: 3730 mL  Total NET: -1820 mL                                   11.4   4.19  )-----------( 151      ( 2019 06:37 )             35.3     11-23    140  |  97<L>  |  33.0<H>  ----------------------------<  149<H>  4.3   |  31.0<H>  |  1.49<H>    Ca    9.8      2019 06:37  Phos  4.1       Mg     1.7         TPro  6.2<L>  /  Alb  3.2<L>  /  TBili  1.9  /  DBili  x   /  AST  20  /  ALT  12  /  AlkPhos  86      CAPILLARY BLOOD GLUCOSE  POCT Blood Glucose.: 127 mg/dL (2019 08:07)  POCT Blood Glucose.: 153 mg/dL (2019 21:50)  POCT Blood Glucose.: 147 mg/dL (2019 16:47)  POCT Blood Glucose.: 128 mg/dL (2019 12:10)    Urinalysis Basic - ( 2019 00:43 )    Color: Yellow / Appearance: Clear / S.010 / pH: x  Gluc: x / Ketone: Negative  / Bili: Negative / Urobili: Negative mg/dL   Blood: x / Protein: Negative mg/dL / Nitrite: Negative   Leuk Esterase: Negative / RBC: x / WBC x   Sq Epi: x / Non Sq Epi: x / Bacteria: x    Hemoglobin A1C, Whole Blood: 7.6 % (19 @ 09:34)    MEDICATIONS  (STANDING):  aMIOdarone    Tablet 200 milliGRAM(s) Oral daily  aspirin  chewable 81 milliGRAM(s) Oral daily  atorvastatin 20 milliGRAM(s) Oral at bedtime  carvedilol 3.125 milliGRAM(s) Oral every 12 hours  dextrose 5%. 1000 milliLiter(s) (50 mL/Hr) IV Continuous <Continuous>  dextrose 50% Injectable 12.5 Gram(s) IV Push once  digoxin     Tablet 0.125 milliGRAM(s) Oral daily  enoxaparin Injectable 80 milliGRAM(s) SubCutaneous every 12 hours  furosemide   Injectable 40 milliGRAM(s) IV Push two times a day  insulin lispro (HumaLOG) corrective regimen sliding scale   SubCutaneous three times a day before meals  magnesium oxide 400 milliGRAM(s) Oral daily  spironolactone 25 milliGRAM(s) Oral daily    MEDICATIONS  (PRN):  acetaminophen   Tablet .. 650 milliGRAM(s) Oral every 6 hours PRN Mild Pain (1 - 3)  dextrose 40% Gel 15 Gram(s) Oral once PRN Blood Glucose LESS THAN 70 milliGRAM(s)/deciliter  glucagon  Injectable 1 milliGRAM(s) IntraMuscular once PRN Glucose LESS THAN 70 milligrams/deciliter    RADIOLOGY & ADDITIONAL TESTS: CC: SOB, swelling (22 Nov 2019 10:02)    INTERVAL HPI/OVERNIGHT EVENTS:  no complaints  continues to have large amount of diuresis  denies chest pain, lightheadness, dizziness    Vital Signs Last 24 Hrs  T(C): 36.4 (23 Nov 2019 05:24), Max: 36.7 (22 Nov 2019 22:46)  T(F): 97.5 (23 Nov 2019 05:24), Max: 98.1 (22 Nov 2019 22:46)  HR: 60 (23 Nov 2019 05:24) (60 - 60)  BP: 140/80 (23 Nov 2019 05:24) (103/69 - 140/80)  BP(mean): --  RR: 18 (23 Nov 2019 05:24) (18 - 18)  SpO2: 96% (23 Nov 2019 05:24) (96% - 98%)    PHYSICAL EXAM:  General: unkept; in no acute distress  Head: Normocephalic; atraumatic  ENMT: No nasal discharge; airway clear  Neck: Supple; non tender; no masses  Respiratory: No wheezes, rales or rhonchi  Cardiovascular: Regular rate and rhythm. S1 and S2 Normal; No murmurs, gallops or rubs  Gastrointestinal: Soft non-tender non-distended; Normal bowel sounds  Genitourinary: No costovertebral angle tenderness  Extremities: Normal range of motion, No clubbing, cyanosis; edema pitting +3 up to the knees including in the upper extremities L>R  Neurological: Alert and oriented x4  Skin: Warm and dry. No acute rash  Psychiatric: Cooperative and appropriate    I&O's Detail  -------------------------------------------------------  IN:  Total IN: 1910 mL  Total OUT: 3730 mL  Total NET: -1820 mL             CAPILLARY BLOOD GLUCOSE      POCT Blood Glucose.: 131 mg/dL (23 Nov 2019 12:20)  POCT Blood Glucose.: 127 mg/dL (23 Nov 2019 08:07)  POCT Blood Glucose.: 153 mg/dL (22 Nov 2019 21:50)  POCT Blood Glucose.: 147 mg/dL (22 Nov 2019 16:47)    11-23    140  |  97<L>  |  33.0<H>  ----------------------------<  149<H>  4.3   |  31.0<H>  |  1.49<H>    Ca    9.8      23 Nov 2019 06:37  Phos  4.1     11-23  Mg     1.7     11-23    TPro  6.2<L>  /  Alb  3.2<L>  /  TBili  1.9  /  DBili  x   /  AST  20  /  ALT  12  /  AlkPhos  86  11-22                        11.4   4.19  )-----------( 151      ( 23 Nov 2019 06:37 )             35.3     MEDICATIONS  (STANDING):  aMIOdarone    Tablet 200 milliGRAM(s) Oral daily  aspirin  chewable 81 milliGRAM(s) Oral daily  atorvastatin 20 milliGRAM(s) Oral at bedtime  carvedilol 3.125 milliGRAM(s) Oral every 12 hours  dextrose 5%. 1000 milliLiter(s) (50 mL/Hr) IV Continuous <Continuous>  dextrose 50% Injectable 12.5 Gram(s) IV Push once  digoxin     Tablet 0.125 milliGRAM(s) Oral daily  enoxaparin Injectable 80 milliGRAM(s) SubCutaneous every 12 hours  furosemide   Injectable 40 milliGRAM(s) IV Push two times a day  insulin lispro (HumaLOG) corrective regimen sliding scale   SubCutaneous three times a day before meals  magnesium oxide 400 milliGRAM(s) Oral daily  spironolactone 25 milliGRAM(s) Oral daily    MEDICATIONS  (PRN):  acetaminophen   Tablet .. 650 milliGRAM(s) Oral every 6 hours PRN Mild Pain (1 - 3)  dextrose 40% Gel 15 Gram(s) Oral once PRN Blood Glucose LESS THAN 70 milliGRAM(s)/deciliter  glucagon  Injectable 1 milliGRAM(s) IntraMuscular once PRN Glucose LESS THAN 70 milligrams/deciliter    RADIOLOGY & ADDITIONAL TESTS:

## 2019-11-23 NOTE — PROGRESS NOTE ADULT - ASSESSMENT
73 y/o homeless male PMH Afib s/p cardioversion 11/2018 (previously on coumadin), HTN, DM, HLD, CAD s/p 3 V CABG, Mitral Valve repair, MARY LOU clip, ICM EF <20%, NYHA IIIA, BoSci ICD presents to ED with c/o several days worsening SOB, dyspnea, edema. Pt non compliant with medications.  presents with dyspnea and sob

## 2019-11-23 NOTE — PROGRESS NOTE ADULT - SUBJECTIVE AND OBJECTIVE BOX
[facility]  [address], [place] [address]    REASON FOR VISIT: Follow up on chf  UPDATE:  Urinating well but still quite fluid overloaded  TELEMETRY MONITORING:  Av paced    11-23    140  |  97<L>  |  33.0<H>  ----------------------------<  149<H>  4.3   |  31.0<H>  |  1.49<H>    Ca    9.8      23 Nov 2019 06:37  Phos  4.1     11-23  Mg     1.7     11-23    TPro  6.2<L>  /  Alb  3.2<L>  /  TBili  1.9  /  DBili  x   /  AST  20  /  ALT  12  /  AlkPhos  86  11-22    LIVER FUNCTIONS - ( 22 Nov 2019 06:17 )  Alb: 3.2 g/dL / Pro: 6.2 g/dL / ALK PHOS: 86 U/L / ALT: 12 U/L / AST: 20 U/L / GGT: x           11-22-19 @ 07:01  -  11-23-19 @ 07:00  --------------------------------------------------------  IN: 580 mL / OUT: 1000 mL / NET: -420 mL    MEDICATIONS  (STANDING):  aMIOdarone    Tablet 200 milliGRAM(s) Oral daily  aspirin  chewable 81 milliGRAM(s) Oral daily  atorvastatin 20 milliGRAM(s) Oral at bedtime  carvedilol 3.125 milliGRAM(s) Oral every 12 hours  dextrose 5%. 1000 milliLiter(s) (50 mL/Hr) IV Continuous <Continuous>  dextrose 50% Injectable 12.5 Gram(s) IV Push once  digoxin     Tablet 0.125 milliGRAM(s) Oral daily  enoxaparin Injectable 80 milliGRAM(s) SubCutaneous every 12 hours  furosemide   Injectable 40 milliGRAM(s) IV Push two times a day  insulin lispro (HumaLOG) corrective regimen sliding scale   SubCutaneous three times a day before meals  magnesium oxide 400 milliGRAM(s) Oral daily  spironolactone 25 milliGRAM(s) Oral daily    ROS:  No fever chills  Cardiac  No cp sob or palp  Resp  no cough no mucus production  Gi  no abd pain no melana  Ext No calf tenderness + edema    Vital Signs Last 24 Hrs  T(C): 36.7 (23 Nov 2019 10:53), Max: 36.7 (22 Nov 2019 22:46)  T(F): 98.1 (23 Nov 2019 10:53), Max: 98.1 (22 Nov 2019 22:46)  HR: 60 (23 Nov 2019 10:53) (60 - 60)  BP: 114/66 (23 Nov 2019 10:53) (103/69 - 140/80)  BP(mean): --  RR: 18 (23 Nov 2019 10:53) (16 - 18)  SpO2: 99% (23 Nov 2019 10:53) (95% - 99%)  T(C): 36.7 (11-23-19 @ 10:53), Max: 36.7 (11-22-19 @ 22:46)  HR: 60 (11-23-19 @ 10:53) (60 - 60)  BP: 114/66 (11-23-19 @ 10:53) (103/69 - 140/80)  RR: 18 (11-23-19 @ 10:53) (16 - 18)  SpO2: 99% (11-23-19 @ 10:53) (95% - 99%)    Summary:   1. Endocardial visualization was enhanced with intravenous echo contrast.   2. Severely decreased global left ventricular systolic function. Left   ventricular ejection fraction, by visual estimation, is <20%.   3. Moderately enlarged right ventricle. Moderately reduced RV systolic   function.   4. Moderate-severe tricuspid regurgitation.   5. Normally functioning mitral valve annuloplasty ring - no significant   stenosis, mild mitral regurgitation.   6. Mild to moderate aortic regurgitation.   7. Estimated pulmonary artery systolic pressure is 59.1 mmHg assuming a   right atrial pressure of 15 mmHg, which is consistent with moderate   pulmonary hypertension.   8. Dilated ascending aorta.   9. There is no evidence of pericardial effusion.      HEENT Head atraumatic eomi, oral mucosa moist  CV S1&S2 regular  RESP  Occasional rales decreased breath sounds  GI  Soft active bowel sounds non tender  EXT  No clubbing/Cyanosis  2 plus pitting edema  NEURO  Alert oriented  No gross motor or sensory deficits

## 2019-11-24 DIAGNOSIS — I48.91 UNSPECIFIED ATRIAL FIBRILLATION: ICD-10-CM

## 2019-11-24 LAB
ANION GAP SERPL CALC-SCNC: 15 MMOL/L — SIGNIFICANT CHANGE UP (ref 5–17)
BASOPHILS # BLD AUTO: 0.03 K/UL — SIGNIFICANT CHANGE UP (ref 0–0.2)
BASOPHILS NFR BLD AUTO: 0.8 % — SIGNIFICANT CHANGE UP (ref 0–2)
BUN SERPL-MCNC: 41 MG/DL — HIGH (ref 8–20)
CALCIUM SERPL-MCNC: 9.9 MG/DL — SIGNIFICANT CHANGE UP (ref 8.6–10.2)
CHLORIDE SERPL-SCNC: 89 MMOL/L — LOW (ref 98–107)
CO2 SERPL-SCNC: 32 MMOL/L — HIGH (ref 22–29)
CREAT SERPL-MCNC: 1.77 MG/DL — HIGH (ref 0.5–1.3)
EOSINOPHIL # BLD AUTO: 0.07 K/UL — SIGNIFICANT CHANGE UP (ref 0–0.5)
EOSINOPHIL NFR BLD AUTO: 1.8 % — SIGNIFICANT CHANGE UP (ref 0–6)
GLUCOSE BLDC GLUCOMTR-MCNC: 132 MG/DL — HIGH (ref 70–99)
GLUCOSE BLDC GLUCOMTR-MCNC: 136 MG/DL — HIGH (ref 70–99)
GLUCOSE BLDC GLUCOMTR-MCNC: 150 MG/DL — HIGH (ref 70–99)
GLUCOSE BLDC GLUCOMTR-MCNC: 169 MG/DL — HIGH (ref 70–99)
GLUCOSE SERPL-MCNC: 164 MG/DL — HIGH (ref 70–115)
HCT VFR BLD CALC: 36.3 % — LOW (ref 39–50)
HGB BLD-MCNC: 11.5 G/DL — LOW (ref 13–17)
IMM GRANULOCYTES NFR BLD AUTO: 0.3 % — SIGNIFICANT CHANGE UP (ref 0–1.5)
LYMPHOCYTES # BLD AUTO: 0.95 K/UL — LOW (ref 1–3.3)
LYMPHOCYTES # BLD AUTO: 25.1 % — SIGNIFICANT CHANGE UP (ref 13–44)
MAGNESIUM SERPL-MCNC: 1.7 MG/DL — SIGNIFICANT CHANGE UP (ref 1.6–2.6)
MCHC RBC-ENTMCNC: 30.6 PG — SIGNIFICANT CHANGE UP (ref 27–34)
MCHC RBC-ENTMCNC: 31.7 GM/DL — LOW (ref 32–36)
MCV RBC AUTO: 96.5 FL — SIGNIFICANT CHANGE UP (ref 80–100)
MONOCYTES # BLD AUTO: 0.35 K/UL — SIGNIFICANT CHANGE UP (ref 0–0.9)
MONOCYTES NFR BLD AUTO: 9.2 % — SIGNIFICANT CHANGE UP (ref 2–14)
NEUTROPHILS # BLD AUTO: 2.38 K/UL — SIGNIFICANT CHANGE UP (ref 1.8–7.4)
NEUTROPHILS NFR BLD AUTO: 62.8 % — SIGNIFICANT CHANGE UP (ref 43–77)
PHOSPHATE SERPL-MCNC: 4 MG/DL — SIGNIFICANT CHANGE UP (ref 2.4–4.7)
PLATELET # BLD AUTO: 159 K/UL — SIGNIFICANT CHANGE UP (ref 150–400)
POTASSIUM SERPL-MCNC: 3.6 MMOL/L — SIGNIFICANT CHANGE UP (ref 3.5–5.3)
POTASSIUM SERPL-SCNC: 3.6 MMOL/L — SIGNIFICANT CHANGE UP (ref 3.5–5.3)
RBC # BLD: 3.76 M/UL — LOW (ref 4.2–5.8)
RBC # FLD: 16.9 % — HIGH (ref 10.3–14.5)
SODIUM SERPL-SCNC: 136 MMOL/L — SIGNIFICANT CHANGE UP (ref 135–145)
WBC # BLD: 3.79 K/UL — LOW (ref 3.8–10.5)
WBC # FLD AUTO: 3.79 K/UL — LOW (ref 3.8–10.5)

## 2019-11-24 PROCEDURE — 99233 SBSQ HOSP IP/OBS HIGH 50: CPT

## 2019-11-24 PROCEDURE — 99232 SBSQ HOSP IP/OBS MODERATE 35: CPT

## 2019-11-24 PROCEDURE — 76775 US EXAM ABDO BACK WALL LIM: CPT | Mod: 26

## 2019-11-24 RX ORDER — POTASSIUM CHLORIDE 20 MEQ
40 PACKET (EA) ORAL EVERY 4 HOURS
Refills: 0 | Status: COMPLETED | OUTPATIENT
Start: 2019-11-24 | End: 2019-11-24

## 2019-11-24 RX ADMIN — MAGNESIUM OXIDE 400 MG ORAL TABLET 400 MILLIGRAM(S): 241.3 TABLET ORAL at 11:59

## 2019-11-24 RX ADMIN — Medication 40 MILLIGRAM(S): at 17:19

## 2019-11-24 RX ADMIN — ENOXAPARIN SODIUM 80 MILLIGRAM(S): 100 INJECTION SUBCUTANEOUS at 10:17

## 2019-11-24 RX ADMIN — Medication 40 MILLIEQUIVALENT(S): at 12:00

## 2019-11-24 RX ADMIN — Medication 81 MILLIGRAM(S): at 11:59

## 2019-11-24 RX ADMIN — LOSARTAN POTASSIUM 25 MILLIGRAM(S): 100 TABLET, FILM COATED ORAL at 05:29

## 2019-11-24 RX ADMIN — Medication 0.12 MILLIGRAM(S): at 05:29

## 2019-11-24 RX ADMIN — Medication 40 MILLIEQUIVALENT(S): at 15:28

## 2019-11-24 RX ADMIN — ATORVASTATIN CALCIUM 20 MILLIGRAM(S): 80 TABLET, FILM COATED ORAL at 22:02

## 2019-11-24 RX ADMIN — Medication 40 MILLIGRAM(S): at 05:30

## 2019-11-24 RX ADMIN — CARVEDILOL PHOSPHATE 3.12 MILLIGRAM(S): 80 CAPSULE, EXTENDED RELEASE ORAL at 05:29

## 2019-11-24 RX ADMIN — ENOXAPARIN SODIUM 80 MILLIGRAM(S): 100 INJECTION SUBCUTANEOUS at 22:02

## 2019-11-24 RX ADMIN — SPIRONOLACTONE 25 MILLIGRAM(S): 25 TABLET, FILM COATED ORAL at 05:29

## 2019-11-24 RX ADMIN — AMIODARONE HYDROCHLORIDE 200 MILLIGRAM(S): 400 TABLET ORAL at 05:30

## 2019-11-24 RX ADMIN — CARVEDILOL PHOSPHATE 3.12 MILLIGRAM(S): 80 CAPSULE, EXTENDED RELEASE ORAL at 17:19

## 2019-11-24 NOTE — DIETITIAN INITIAL EVALUATION ADULT. - PERTINENT LABORATORY DATA
11-24 Na136 mmol/L Glu 164 mg/dL<H> K+ 3.6 mmol/L Cr  1.77 mg/dL<H> BUN 41.0 mg/dL<H> Phos 4.0 mg/dL Alb n/a   PAB n/a

## 2019-11-24 NOTE — PROGRESS NOTE ADULT - ASSESSMENT
75 y/o male PMH Afib s/p cardioversion 11/2018 (previously on coumadin), HTN, DM, HLD, CAD s/p 3 V CABG, Mitral Valve repair, MARY LOU clip, ICM EF <20%, NYHA IIIA, BoSci ICD presents to ED with c/o several days worsening SOB, dyspnea, edema. Pt non compliant with medications. pt states sometimes live in homeless center, as per ED Doc, family wanting to move him to [place] but pt insists on going back to [place]. Pt is poor historian, endorses shortness of breath, orthopnea, dyspnea on exertion, "swollen everywhere", diarrhea . Denies fever, chills, cough, phlegm production, chest pain, pressure, nausea, vomiting, abd pain or urinary complaints. Daughter called for further information as pt is a poor historian. As pr daughter pt has intermittent sporadic behavior where he stays in their rented apartment for sometime and then suddenly packs his bags and leaves to go somewhere else. This has become more frequent after the passing of her mom in 2004. Pt was a heavy smoker & an alcoholic in th past. No diagnosis of any psychiatric illness in the past.  As per pt he hasnt taken his meds for months & has not seen a doctor for years.     Acute on Chronic HFrEF  - non compliant with medications  - RANDOLPH 2018- EF < 20%, severely enlarged LA, severely reduced LV RV systolic function  - c/w digoxin 0.125mg daily, coreg 3.125mg daily (pt was on entresto & amiodarone in the past)  - on lasix 40mg IV BID - I/O as above; agree with continuing for now   Spironolactone 25mg PO daily  - Repeat TTE: EF < 20%, Mod -severe TR, mod PHTN  - BoSci ICD, called for interrogation- no VT, AFib  - B/L LE venous doppler -ve for DVT  - Monitor on telemetry.   - Strict i/o and daily weights  - Keep K > 4, Mg > 2.   - Monitor renal function with ongoing diuresis.  - Cardio follow up    DEV with increasing bicarb - creatinine increasing; may be related to diuresis  - f/u BMP  - check US of kidneys  - if worsening may need nephrology    CAD s/p CABG  - asa 81 daily  - statin  - BB as above    paroxysmal Afib  - s/p cardioversion   - GFR 51 c/w Lovenox BID for now. Change Lovenox to heparin gtt if GFR < 30   will need AC out patient (pt was on Eliquis & amiodarone in the past)  - telemetry monitoring for now.    Leukopenia with pancytopenia  - stable at this time  - etiology unclear  - continue to monitor  - can check b12  - consider outpatient heme/onc eval    DM type 2- pt was on metformin & Glimepiride in the past  A1c  FS with ISS    HTN/ HLD- c/w BB, hold lisinopril, c/w statin    DVT ppx: on Lovenox    DISPO: patient needs more diuresis; Reassess dispo on Monday based on patient's fluid status. Patient daughter is looking for an apartment. CM following. 75 y/o male PMH Afib s/p cardioversion 11/2018 (previously on coumadin), HTN, DM, HLD, CAD s/p 3 V CABG, Mitral Valve repair, MARY LOU clip, ICM EF <20%, NYHA IIIA, BoSci ICD presents to ED with c/o several days worsening SOB, dyspnea, edema. Pt non compliant with medications. pt states sometimes live in homeless center, as per ED Doc, family wanting to move him to [place] but pt insists on going back to [place]. Pt is poor historian, endorses shortness of breath, orthopnea, dyspnea on exertion, "swollen everywhere", diarrhea . Denies fever, chills, cough, phlegm production, chest pain, pressure, nausea, vomiting, abd pain or urinary complaints. Daughter called for further information as pt is a poor historian. As pr daughter pt has intermittent sporadic behavior where he stays in their rented apartment for sometime and then suddenly packs his bags and leaves to go somewhere else. This has become more frequent after the passing of her mom in 2004. Pt was a heavy smoker & an alcoholic in th past. No diagnosis of any psychiatric illness in the past.  As per pt he hasnt taken his meds for months & has not seen a doctor for years.    Patient has been treated for systolic CHF exacerbation with IV lasix; he's had good urine output and his exam has improved greatly. Cardiac medications adjusted per cardiology. CM and SW following as patient no longer interested in living in friend of apartment and is homeless currently. Daughter is looking for housing in this area for him.    Acute on Chronic HFrEF  - non compliant with medications  - RANDOLPH 2018- EF < 20%, severely enlarged LA, severely reduced LV RV systolic function  - c/w coreg 3.125mg daily (pt was on entresto & amiodarone in the past)  - digoxin was discontinued by cardiology  - on lasix 40mg IV BID - I/O as above; agree with continuing for now   Spironolactone 25mg PO daily  - Repeat TTE: EF < 20%, Mod -severe TR, mod PHTN  - BoSci ICD, called for interrogation- no VT, AFib  - B/L LE venous doppler -ve for DVT  - Monitor on telemetry.   - Strict i/o and daily weights  - Keep K > 4, Mg > 2.   - Monitor renal function with ongoing diuresis.    DEV with increasing bicarb - creatinine increasing; may be related to diuresis  - f/u BMP  - check US of kidneys  - if worsening may need nephrology    CAD s/p CABG  - asa 81 daily  - statin  - BB as above    paroxysmal Afib  - s/p cardioversion   - GFR 51 c/w Lovenox BID for now. Change Lovenox to heparin gtt if GFR < 30   will need AC out patient (pt was on Eliquis & amiodarone in the past)  - telemetry monitoring for now.    Leukopenia with pancytopenia  - stable at this time  - etiology unclear - patient denies prior drug/alcohol use  - continue to monitor  - can check b12  - consider outpatient heme/onc eval    DM type 2- pt was on metformin & Glimepiride in the past  A1c  FS with ISS    HTN/ HLD- c/w BB, hold lisinopril, c/w statin    DVT ppx: on Lovenox    DISPO: patient needs more diuresis; Reassess dispo on Monday based on patient's fluid status. Patient daughter is looking for an apartment. CM following.

## 2019-11-24 NOTE — PROGRESS NOTE ADULT - PROBLEM SELECTOR PLAN 1
Plan: Low sodium diet  Cardiomyopahty EF 20%  Lasix spirolactone  good urine out put  supplement k  Arb started  follow creat  Hypokalemia replace k  Lasix 40 ivp q12, losartan 25, digoxin

## 2019-11-24 NOTE — PROGRESS NOTE ADULT - SUBJECTIVE AND OBJECTIVE BOX
[facility]  [address] [id number]    REASON FOR VISIT:  Follow up on chf  UPDATE:  Hoang  TELEMETRY MONITORING: AV paced    11-24    136  |  89<L>  |  41.0<H>  ----------------------------<  164<H>  3.6   |  32.0<H>  |  1.77<H>    Ca    9.9      24 Nov 2019 06:06  Phos  4.0     11-24  Mg     1.7     11-24 11-23-19 @ 07:01  -  11-24-19 @ 07:00  --------------------------------------------------------  IN: 360 mL / OUT: 0 mL / NET: 360 mL    MEDICATIONS  (STANDING):  aMIOdarone    Tablet 200 milliGRAM(s) Oral daily  aspirin  chewable 81 milliGRAM(s) Oral daily  atorvastatin 20 milliGRAM(s) Oral at bedtime  carvedilol 3.125 milliGRAM(s) Oral every 12 hours  dextrose 5%. 1000 milliLiter(s) (50 mL/Hr) IV Continuous <Continuous>  dextrose 50% Injectable 12.5 Gram(s) IV Push once  digoxin     Tablet 0.125 milliGRAM(s) Oral daily  enoxaparin Injectable 80 milliGRAM(s) SubCutaneous every 12 hours  furosemide   Injectable 40 milliGRAM(s) IV Push two times a day  insulin lispro (HumaLOG) corrective regimen sliding scale   SubCutaneous three times a day before meals  losartan 25 milliGRAM(s) Oral daily  magnesium oxide 400 milliGRAM(s) Oral daily  spironolactone 25 milliGRAM(s) Oral daily    ROS:  No fever chills  Cardiac  No cp sob or palp  Resp  no cough no mucus production  Gi  no abd pain no melana  Ext No calf tenderness, + edema    Vital Signs Last 24 Hrs  T(C): 36.4 (24 Nov 2019 05:26), Max: 36.7 (23 Nov 2019 10:53)  T(F): 97.6 (24 Nov 2019 05:26), Max: 98.1 (23 Nov 2019 10:53)  HR: 60 (24 Nov 2019 05:26) (60 - 60)  BP: 118/60 (24 Nov 2019 05:26) (111/68 - 120/60)  BP(mean): --  RR: 18 (24 Nov 2019 05:26) (18 - 18)  SpO2: 94% (24 Nov 2019 05:26) (94% - 99%)  T(C): 36.4 (11-24-19 @ 05:26), Max: 36.7 (11-23-19 @ 10:53)  HR: 60 (11-24-19 @ 05:26) (60 - 60)  BP: 118/60 (11-24-19 @ 05:26) (111/68 - 120/60)  RR: 18 (11-24-19 @ 05:26) (18 - 18)  SpO2: 94% (11-24-19 @ 05:26) (94% - 99%)    HEENT Head atraumatic eomi, oral mucosa moist  CV S1&S2      No r/g/ m   RESP    GI  Soft active bowel sounds non tender  EXT  No clubbing/Cyanosis /Edema  NEURO  Alert oriented  No gross motor or sensory deficits    < from: TTE Echo Complete w/Doppler (11.20.19 @ 17:00) >   1. Endocardial visualization was enhanced with intravenous echo contrast.   2. Severely decreased global left ventricular systolic function. Left   ventricular ejection fraction, by visual estimation, is <20%.   3. Moderately enlarged right ventricle. Moderately reduced RV systolic   function.   4. Moderate-severe tricuspid regurgitation.   5. Normally functioning mitral valve annuloplasty ring - no significant   stenosis, mild mitral regurgitation.   6. Mild to moderate aortic regurgitation.   7. Estimated pulmonary artery systolic pressure is 59.1 mmHg assuming a   right atrial pressure of 15 mmHg, which is consistent with moderate   pulmonary hypertension.   8. Dilated ascending aorta.   9. There is no evidence of pericardial effusion.

## 2019-11-24 NOTE — DIETITIAN INITIAL EVALUATION ADULT. - OTHER INFO
75 y/o male PMH Afib s/p cardioversion, HTN, DM, HLD, CAD s/p 3 V CABG, Mitral Valve repair, MARY LOU clip, ICM EF <20%, NYHA IIIA, BoSci ICD admitted to ED with c/o several days worsening SOB, dyspnea, edema. Pt reports good appetite prior to admission and currently and is consuming 100% of meals. Pt reports feeling hungry at night. Pt reports no recent changes in weight. Pt reported not following any diet at home and was provided with verbal and written low sodium nutrition education. Pt encouraged to save snacks for at night to help with hunger. Aware of trace edema in left and right feet and left and right ankles.

## 2019-11-24 NOTE — PROGRESS NOTE ADULT - ASSESSMENT
75 y/o homeless male PMH Afib s/p cardioversion 11/2018 (previously on coumadin), HTN, DM, HLD, CAD s/p 3 V CABG, Mitral Valve repair, MARY LOU clip, ICM EF <20%, NYHA IIIA, BoSci ICD presents to ED with c/o several days worsening SOB, dyspnea, edema. Pt non compliant with medications.  presents with dyspnea and sob

## 2019-11-24 NOTE — PROGRESS NOTE ADULT - SUBJECTIVE AND OBJECTIVE BOX
CC: SOB, swelling (22 Nov 2019 10:02)    INTERVAL HPI/OVERNIGHT EVENTS:  no complaints  continues to have large amount of diuresis  denies chest pain, lightheadness, dizziness    Vital Signs Last 24 Hrs  T(C): 36.4 (23 Nov 2019 05:24), Max: 36.7 (22 Nov 2019 22:46)  T(F): 97.5 (23 Nov 2019 05:24), Max: 98.1 (22 Nov 2019 22:46)  HR: 60 (23 Nov 2019 05:24) (60 - 60)  BP: 140/80 (23 Nov 2019 05:24) (103/69 - 140/80)  BP(mean): --  RR: 18 (23 Nov 2019 05:24) (18 - 18)  SpO2: 96% (23 Nov 2019 05:24) (96% - 98%)    PHYSICAL EXAM:  General: unkept; in no acute distress  Head: Normocephalic; atraumatic  ENMT: No nasal discharge; airway clear  Neck: Supple; non tender; no masses  Respiratory: No wheezes, rales or rhonchi  Cardiovascular: Regular rate and rhythm. S1 and S2 Normal; No murmurs, gallops or rubs  Gastrointestinal: Soft non-tender non-distended; Normal bowel sounds  Genitourinary: No costovertebral angle tenderness  Extremities: Normal range of motion, No clubbing, cyanosis; edema pitting +3 up to the knees including in the upper extremities L>R  Neurological: Alert and oriented x4  Skin: Warm and dry. No acute rash  Psychiatric: Cooperative and appropriate    I&O's Detail  -------------------------------------------------------  IN:  Total IN: 2270 mL  Total OUT: 3730 mL  Total NET: -1460 mL             CAPILLARY BLOOD GLUCOSE  POCT Blood Glucose.: 132 mg/dL (24 Nov 2019 08:20)  POCT Blood Glucose.: 118 mg/dL (23 Nov 2019 20:58)  POCT Blood Glucose.: 133 mg/dL (23 Nov 2019 17:12)  POCT Blood Glucose.: 131 mg/dL (23 Nov 2019 12:20)                          11.5   3.79  )-----------( 159      ( 24 Nov 2019 06:06 )             36.3     11-24    136  |  89<L>  |  41.0<H>  ----------------------------<  164<H>  3.6   |  32.0<H>  |  1.77<H>    Ca    9.9      24 Nov 2019 06:06  Phos  4.0     11-24  Mg     1.7     11-24    MEDICATIONS  (STANDING):  aMIOdarone    Tablet 200 milliGRAM(s) Oral daily  aspirin  chewable 81 milliGRAM(s) Oral daily  atorvastatin 20 milliGRAM(s) Oral at bedtime  carvedilol 3.125 milliGRAM(s) Oral every 12 hours  dextrose 5%. 1000 milliLiter(s) (50 mL/Hr) IV Continuous <Continuous>  dextrose 50% Injectable 12.5 Gram(s) IV Push once  digoxin     Tablet 0.125 milliGRAM(s) Oral daily  enoxaparin Injectable 80 milliGRAM(s) SubCutaneous every 12 hours  furosemide   Injectable 40 milliGRAM(s) IV Push two times a day  insulin lispro (HumaLOG) corrective regimen sliding scale   SubCutaneous three times a day before meals  magnesium oxide 400 milliGRAM(s) Oral daily  spironolactone 25 milliGRAM(s) Oral daily    MEDICATIONS  (PRN):  acetaminophen   Tablet .. 650 milliGRAM(s) Oral every 6 hours PRN Mild Pain (1 - 3)  dextrose 40% Gel 15 Gram(s) Oral once PRN Blood Glucose LESS THAN 70 milliGRAM(s)/deciliter  glucagon  Injectable 1 milliGRAM(s) IntraMuscular once PRN Glucose LESS THAN 70 milligrams/deciliter    RADIOLOGY & ADDITIONAL TESTS: CC: SOB, swelling (22 Nov 2019 10:02)    INTERVAL HPI/OVERNIGHT EVENTS:  no complaints  continues to have large amount of diuresis  denies chest pain, lightheadness, dizziness    Vital Signs Last 24 Hrs  T(C): 36.4 (23 Nov 2019 05:24), Max: 36.7 (22 Nov 2019 22:46)  T(F): 97.5 (23 Nov 2019 05:24), Max: 98.1 (22 Nov 2019 22:46)  HR: 60 (23 Nov 2019 05:24) (60 - 60)  BP: 140/80 (23 Nov 2019 05:24) (103/69 - 140/80)  BP(mean): --  RR: 18 (23 Nov 2019 05:24) (18 - 18)  SpO2: 96% (23 Nov 2019 05:24) (96% - 98%)    PHYSICAL EXAM:  General: unkept; in no acute distress  Head: Normocephalic; atraumatic  ENMT: No nasal discharge; airway clear  Neck: Supple; non tender; no masses  Respiratory: No wheezes, rales or rhonchi  Cardiovascular: Regular rate and rhythm. S1 and S2 Normal; No murmurs, gallops or rubs  Gastrointestinal: Soft non-tender non-distended; Normal bowel sounds  Genitourinary: No costovertebral angle tenderness  Extremities: Normal range of motion, No clubbing, cyanosis; edema was originally pitting +3 up to the knees including in the upper extremities L>R; now he is without edema in the upper extremities and lower extremity edema is improved  Neurological: Alert and oriented x4  Skin: Warm and dry. No acute rash  Psychiatric: Cooperative and appropriate    I&O's Detail  -------------------------------------------------------  undocumented today             CAPILLARY BLOOD GLUCOSE  POCT Blood Glucose.: 150 mg/dL (24 Nov 2019 17:06)  POCT Blood Glucose.: 136 mg/dL (24 Nov 2019 12:06)  POCT Blood Glucose.: 132 mg/dL (24 Nov 2019 08:20)  POCT Blood Glucose.: 118 mg/dL (23 Nov 2019 20:58)                        11.5   3.79  )-----------( 159      ( 24 Nov 2019 06:06 )             36.3     11-24    136  |  89<L>  |  41.0<H>  ----------------------------<  164<H>  3.6   |  32.0<H>  |  1.77<H>    Ca    9.9      24 Nov 2019 06:06  Phos  4.0     11-24  Mg     1.7     11-24    MEDICATIONS  (STANDING):  aMIOdarone    Tablet 200 milliGRAM(s) Oral daily  aspirin  chewable 81 milliGRAM(s) Oral daily  atorvastatin 20 milliGRAM(s) Oral at bedtime  carvedilol 3.125 milliGRAM(s) Oral every 12 hours  dextrose 5%. 1000 milliLiter(s) (50 mL/Hr) IV Continuous <Continuous>  dextrose 50% Injectable 12.5 Gram(s) IV Push once  digoxin     Tablet 0.125 milliGRAM(s) Oral daily  enoxaparin Injectable 80 milliGRAM(s) SubCutaneous every 12 hours  furosemide   Injectable 40 milliGRAM(s) IV Push two times a day  insulin lispro (HumaLOG) corrective regimen sliding scale   SubCutaneous three times a day before meals  magnesium oxide 400 milliGRAM(s) Oral daily  spironolactone 25 milliGRAM(s) Oral daily    MEDICATIONS  (PRN):  acetaminophen   Tablet .. 650 milliGRAM(s) Oral every 6 hours PRN Mild Pain (1 - 3)  dextrose 40% Gel 15 Gram(s) Oral once PRN Blood Glucose LESS THAN 70 milliGRAM(s)/deciliter  glucagon  Injectable 1 milliGRAM(s) IntraMuscular once PRN Glucose LESS THAN 70 milligrams/deciliter    RADIOLOGY & ADDITIONAL TESTS:

## 2019-11-25 LAB
ANION GAP SERPL CALC-SCNC: 16 MMOL/L — SIGNIFICANT CHANGE UP (ref 5–17)
BUN SERPL-MCNC: 51 MG/DL — HIGH (ref 8–20)
CALCIUM SERPL-MCNC: 10.2 MG/DL — SIGNIFICANT CHANGE UP (ref 8.6–10.2)
CHLORIDE SERPL-SCNC: 92 MMOL/L — LOW (ref 98–107)
CO2 SERPL-SCNC: 30 MMOL/L — HIGH (ref 22–29)
CREAT SERPL-MCNC: 1.66 MG/DL — HIGH (ref 0.5–1.3)
GLUCOSE BLDC GLUCOMTR-MCNC: 141 MG/DL — HIGH (ref 70–99)
GLUCOSE BLDC GLUCOMTR-MCNC: 154 MG/DL — HIGH (ref 70–99)
GLUCOSE BLDC GLUCOMTR-MCNC: 155 MG/DL — HIGH (ref 70–99)
GLUCOSE BLDC GLUCOMTR-MCNC: 193 MG/DL — HIGH (ref 70–99)
GLUCOSE SERPL-MCNC: 135 MG/DL — HIGH (ref 70–115)
HCT VFR BLD CALC: 40.7 % — SIGNIFICANT CHANGE UP (ref 39–50)
HGB BLD-MCNC: 12.8 G/DL — LOW (ref 13–17)
MCHC RBC-ENTMCNC: 30.2 PG — SIGNIFICANT CHANGE UP (ref 27–34)
MCHC RBC-ENTMCNC: 31.4 GM/DL — LOW (ref 32–36)
MCV RBC AUTO: 96 FL — SIGNIFICANT CHANGE UP (ref 80–100)
PLATELET # BLD AUTO: 185 K/UL — SIGNIFICANT CHANGE UP (ref 150–400)
POTASSIUM SERPL-MCNC: 4.3 MMOL/L — SIGNIFICANT CHANGE UP (ref 3.5–5.3)
POTASSIUM SERPL-SCNC: 4.3 MMOL/L — SIGNIFICANT CHANGE UP (ref 3.5–5.3)
RBC # BLD: 4.24 M/UL — SIGNIFICANT CHANGE UP (ref 4.2–5.8)
RBC # FLD: 16.8 % — HIGH (ref 10.3–14.5)
SODIUM SERPL-SCNC: 138 MMOL/L — SIGNIFICANT CHANGE UP (ref 135–145)
VIT B12 SERPL-MCNC: 414 PG/ML — SIGNIFICANT CHANGE UP (ref 232–1245)
WBC # BLD: 4.01 K/UL — SIGNIFICANT CHANGE UP (ref 3.8–10.5)
WBC # FLD AUTO: 4.01 K/UL — SIGNIFICANT CHANGE UP (ref 3.8–10.5)

## 2019-11-25 PROCEDURE — 99233 SBSQ HOSP IP/OBS HIGH 50: CPT

## 2019-11-25 PROCEDURE — 99232 SBSQ HOSP IP/OBS MODERATE 35: CPT

## 2019-11-25 RX ORDER — FUROSEMIDE 40 MG
40 TABLET ORAL DAILY
Refills: 0 | Status: DISCONTINUED | OUTPATIENT
Start: 2019-11-25 | End: 2019-11-26

## 2019-11-25 RX ORDER — APIXABAN 2.5 MG/1
5 TABLET, FILM COATED ORAL EVERY 12 HOURS
Refills: 0 | Status: DISCONTINUED | OUTPATIENT
Start: 2019-11-25 | End: 2019-11-26

## 2019-11-25 RX ADMIN — MAGNESIUM OXIDE 400 MG ORAL TABLET 400 MILLIGRAM(S): 241.3 TABLET ORAL at 11:48

## 2019-11-25 RX ADMIN — Medication 40 MILLIGRAM(S): at 13:31

## 2019-11-25 RX ADMIN — Medication 1: at 17:58

## 2019-11-25 RX ADMIN — APIXABAN 5 MILLIGRAM(S): 2.5 TABLET, FILM COATED ORAL at 13:31

## 2019-11-25 RX ADMIN — Medication 1: at 12:39

## 2019-11-25 RX ADMIN — APIXABAN 5 MILLIGRAM(S): 2.5 TABLET, FILM COATED ORAL at 17:58

## 2019-11-25 RX ADMIN — LOSARTAN POTASSIUM 25 MILLIGRAM(S): 100 TABLET, FILM COATED ORAL at 05:23

## 2019-11-25 RX ADMIN — SPIRONOLACTONE 25 MILLIGRAM(S): 25 TABLET, FILM COATED ORAL at 05:23

## 2019-11-25 RX ADMIN — AMIODARONE HYDROCHLORIDE 200 MILLIGRAM(S): 400 TABLET ORAL at 05:23

## 2019-11-25 RX ADMIN — ATORVASTATIN CALCIUM 20 MILLIGRAM(S): 80 TABLET, FILM COATED ORAL at 21:14

## 2019-11-25 RX ADMIN — CARVEDILOL PHOSPHATE 3.12 MILLIGRAM(S): 80 CAPSULE, EXTENDED RELEASE ORAL at 17:58

## 2019-11-25 RX ADMIN — Medication 81 MILLIGRAM(S): at 11:42

## 2019-11-25 RX ADMIN — CARVEDILOL PHOSPHATE 3.12 MILLIGRAM(S): 80 CAPSULE, EXTENDED RELEASE ORAL at 05:23

## 2019-11-25 RX ADMIN — Medication 40 MILLIGRAM(S): at 05:23

## 2019-11-25 NOTE — PROGRESS NOTE ADULT - REASON FOR ADMISSION
SOB, swelling
CHF
SOB, swelling

## 2019-11-25 NOTE — PROGRESS NOTE ADULT - ASSESSMENT
75 y/o male PMH Afib s/p cardioversion 11/2018, HTN, DM2, HLD, CAD s/p 3 V CABG, Mitral Valve repair, MARY LOU clip, ICM EF <20%, NYHA IIIA, BoSci ICD presents to ED with c/o several days worsening SOB, dyspnea, edema. Pt non compliant with medications. pt states sometimes live in homeless center, as per ED Doc, family wanting to move him to [place] but pt insists on going back to [place]. Pt is poor historian, endorses shortness of breath, orthopnea, dyspnea on exertion, "swollen everywhere", diarrhea . Denies fever, chills, cough, phlegm production, chest pain, pressure, nausea, vomiting, abd pain or urinary complaints. Daughter called for further information as pt is a poor historian. As pr daughter pt has intermittent sporadic behavior where he stays in their rented apartment for sometime and then suddenly packs his bags and leaves to go somewhere else. This has become more frequent after the passing of her mom in 2004. Pt was a heavy smoker & an alcoholic in th past. No diagnosis of any psychiatric illness in the past.  As per pt he hasnt taken his meds for months & has not seen a doctor for years.      Acute on Chronic HFrEF: hx  non compliance with medications  - RANDOLPH 2018- EF < 20%, severely enlarged LA, severely reduced LV RV systolic function  - c/w coreg 3.125mg daily (pt was on entresto & amiodarone in the past)  - digoxin was discontinued by cardiology  - change to lasix PO   - BoSci ICD, called for interrogation- no VT, AFib  - B/L LE venous doppler -ve for DVT.     DEV with increasing bicarb: stable   likely due to diuretics/ace   trend, negative ultrasound.     CAD s/p CABG  - asa 81 daily  - statin  - BB as above    paroxysmal Afib  - s/p cardioversion   change to eliquis.     DM type 2- pt was on metformin & Glimepiride in the past  A1c 7.6  FS with ISS      DISPO: seems stable for discharge.  dc planning.

## 2019-11-25 NOTE — PROGRESS NOTE ADULT - SUBJECTIVE AND OBJECTIVE BOX
[facility]                                                        Office: [address] [address]                                                       Telephone: [phone]. Fax:[phone]                                                                             PROGRESS NOTE   Reason for follow up:  congestive heart failure and atrial fibrillation                             Overnight: No new events.   Update:   william states his family in [facility].    Subjective: "  i evelyne ok"   Complains of:  no new symptoms.   Review of symptoms: Cardiac:  No chest pain. No dyspnea. No palpitations.  Respiratory:no cough. No dyspnea  Gastrointestinal: No diarrhea. No abdominal pain. No bleeding.     Past medical history: No updates.   Chronic conditions:  Hypertension: controlled. CHF: improivng. . CAD: Stable ischemic heart disease. DM: Stable;    	  Vitals:  Vital Signs Last 24 Hrs  T(C): 36.5 (11-25-19 @ 10:15), Max: 36.7 (11-24-19 @ 21:59)  T(F): 97.7 (11-25-19 @ 10:15), Max: 98.1 (11-24-19 @ 21:59)  HR: 60 (11-25-19 @ 10:15) (60 - 66)  BP: 99/61 (11-25-19 @ 10:15) (90/57 - 116/69)  BP(mean): --  RR: 18 (11-25-19 @ 10:15) (18 - 18)  SpO2: 97% (11-25-19 @ 05:20) (97% - 99%)    PHYSICAL EXAM:  Appearance: Comfortable. No acute distress  HEENT:  Head and neck: Atraumatic. Normocephalic.  Normal oral mucosa, PERRL, Neck is supple. No JVD, No carotid bruit.   Neurologic: A & O x 3, no focal deficits. EOMI , Cranial nerves are intact.  Lymphatic: No cervical lymphadenopathy  Cardiovascular: Normal S1 S2, No murmur, rubs/gallops. No JVD, No edema  Respiratory: bilateral decrease breath sounds. and crepts.   Gastrointestinal:  Soft, Non-tender, + BS  Lower Extremities: 1+  edema  Psychiatry: Patient is calm. No agitation. Mood & affect appropriate  Skin: No rashes/ ecchymoses/cyanosis/ulcers visualized on the face, hands or feet.    CURRENT MEDICATIONS:  mMEDICATIONS  (STANDING):  aMIOdarone    Tablet 200 milliGRAM(s) Oral daily  carvedilol 3.125 milliGRAM(s) Oral every 12 hours  furosemide    Tablet 40 milliGRAM(s) Oral daily  losartan 25 milliGRAM(s) Oral daily  spironolactone 25 milliGRAM(s) Oral daily    apixaban  aspirin  chewable  atorvastatin  dextrose 5%.  dextrose 50% Injectable  insulin lispro (HumaLOG) corrective regimen sliding scale    PRN: acetaminophen   Tablet .. PRN  dextrose 40% Gel PRN  glucagon  Injectable PRN    LABS:	 	                        12.8   4.01  )-----------( 185      ( 25 Nov 2019 07:07 )             40.7   N=x    ; L=x        25 Nov 2019 07:07    138    |  92     |  51.0   ----------------------------<  135    4.3     |  30.0   |  1.66     Ca    10.2       25 Nov 2019 07:07  Phos  4.0       24 Nov 2019 06:06  Mg     1.7       24 Nov 2019 06:06        TPro  6.2<L>  /  Alb  3.2<L>  /  TBili  1.9  /  DBili  x   /  AST  20  /  ALT  12  /  AlkPhos  86  11-22    proBNP: Serum Pro-Brain Natriuretic Peptide: 93653 pg/mL (11-19 @ 15:14)    Lipid Profile: Date: 11-20 @ 09:34  Total cholesterol 109; Direct LDL: 29; HDL: 69; Triglycerides:57    HgA1c: Hemoglobin A1C, Whole Blood: 7.6 %  TSH:     TELEMETRY: Reviewed    ECG:  Reviewed by me. 	< from: 12 Lead ECG (11.19.19 @ 15:44) >    Diagnosis Line *** Suspect unspecified pacemaker failure  Ventricular-paced rhythm  Abnormal ECG    < end of copied text >      DIAGNOSTIC TESTING:  [ ] Echocardiogram:  Moderate RV dysfunction. LVEF <20%.

## 2019-11-25 NOTE — PROGRESS NOTE ADULT - ASSESSMENT
73 y/o male PMH Afib s/p cardioversion 11/2018 (previously on coumadin), HTN, DM, HLD, CAD s/p 3 V CABG, Mitral Valve repair, MARY LOU clip, ICM EF <20%, NYHA IIIA, BoSci ICD presents to ED with c/o several days worsening SOB, dyspnea, edema. Pt non compliant with medications. pt states sometimes live in homeless center, as per ED Doc, family wanting to move him to [place] but pt insists on going back to [place]. Pt is poor historian, endorses shortness of breath, orthopnea, dyspnea on exertion, "swollen everywhere", diarrhea.    Acute on Chronic HFrEF  - non adherent with medications  - RANDOLPH 2018- EF < 20%, severely enlarged LA, severely reduced LV RV systolic function  ct losartan, ct beta-blocker ,. ct lasix and aldactone.   Atrial Fibrillation and other medical problems:  eliquis     CAD s/p CABD  - asa 81 daily  - statin- all labs pending  - BB as above    Afib  - s/p cardioversion   IN sinus. ct amiodarone . PFT and Opth check outpautent.   eliquis 5 mg Q12.

## 2019-11-25 NOTE — PROGRESS NOTE ADULT - SUBJECTIVE AND OBJECTIVE BOX
seen for HF exac    no acute complaints  feels better  ambulating  ros negative     MEDICATIONS  (STANDING):  aMIOdarone    Tablet 200 milliGRAM(s) Oral daily  apixaban 5 milliGRAM(s) Oral every 12 hours  aspirin  chewable 81 milliGRAM(s) Oral daily  atorvastatin 20 milliGRAM(s) Oral at bedtime  carvedilol 3.125 milliGRAM(s) Oral every 12 hours  dextrose 5%. 1000 milliLiter(s) (50 mL/Hr) IV Continuous <Continuous>  dextrose 50% Injectable 12.5 Gram(s) IV Push once  furosemide    Tablet 40 milliGRAM(s) Oral daily  insulin lispro (HumaLOG) corrective regimen sliding scale   SubCutaneous three times a day before meals  losartan 25 milliGRAM(s) Oral daily  magnesium oxide 400 milliGRAM(s) Oral daily  spironolactone 25 milliGRAM(s) Oral daily    MEDICATIONS  (PRN):  acetaminophen   Tablet .. 650 milliGRAM(s) Oral every 6 hours PRN Mild Pain (1 - 3)  dextrose 40% Gel 15 Gram(s) Oral once PRN Blood Glucose LESS THAN 70 milliGRAM(s)/deciliter  glucagon  Injectable 1 milliGRAM(s) IntraMuscular once PRN Glucose LESS THAN 70 milligrams/deciliter      Allergies    No Known Allergies    Vital Signs Last 24 Hrs  T(C): 36.4 (25 Nov 2019 05:20), Max: 36.7 (24 Nov 2019 21:59)  T(F): 97.6 (25 Nov 2019 05:20), Max: 98.1 (24 Nov 2019 21:59)  HR: 66 (25 Nov 2019 05:20) (60 - 66)  BP: 116/69 (25 Nov 2019 05:20) (90/57 - 116/69)  BP(mean): --  RR: 18 (25 Nov 2019 05:20) (18 - 18)  SpO2: 97% (25 Nov 2019 05:20) (97% - 99%)    PHYSICAL EXAM:    GENERAL: NAD  CHEST/LUNG: Clear to percussion bilaterally  HEART: Regular rate and rhythm; S1 S2  ABDOMEN: Soft, Nontender, Bowel sounds present  EXTREMITIES: trace edema   NERVOUS SYSTEM:  Alert & Oriented X3, Motor Strength 5/5 B/L upper and lower extremities  PSYCH: normal mood, appropriate response.    LABS:                        12.8   4.01  )-----------( 185      ( 25 Nov 2019 07:07 )             40.7     11-25    138  |  92<L>  |  51.0<H>  ----------------------------<  135<H>  4.3   |  30.0<H>  |  1.66<H>    Ca    10.2      25 Nov 2019 07:07  Phos  4.0     11-24  Mg     1.7     11-24            CAPILLARY BLOOD GLUCOSE      POCT Blood Glucose.: 141 mg/dL (25 Nov 2019 08:34)  POCT Blood Glucose.: 169 mg/dL (24 Nov 2019 21:43)  POCT Blood Glucose.: 150 mg/dL (24 Nov 2019 17:06)  POCT Blood Glucose.: 136 mg/dL (24 Nov 2019 12:06)        RADIOLOGY & ADDITIONAL TESTS:

## 2019-11-25 NOTE — PROGRESS NOTE ADULT - ATTENDING COMMENTS
No further in-patient cardiac work-up/management is needed.  Follow-up in cardiology office in 2 weeks.
Disccussed with pt's daughter in details. As per her father is non compliant & not been taking his meds for years.  Pt & daughter not aware of his home meds.
congestive heart failure .   non compliant  to meds. is off meds for > 1 mth.   spoke to daughter in detail. fran does not take meds when he feels good and then returns with CHF.   daughter is out of state  over this weekend.  Consider Pt/OT rehab or discharge home after the weekend.   diuretics. bb and ACE-I.    need more diuresis. metoalzone PRN.  will greta barker.
doing well and continues to improve with diuresis.   He is remaining in sinus rhythm on amiodarone with biventricular pacing.   will discontinue digoxin as in sinus rhythm and CHF improving, as well as plan for continued amiodarone.   continue IV diuresis.
pt comfortable, denies dyspnea and ambulating. Still with marked volume overload, will continue diuresis.   biventricular pacing present

## 2019-11-26 ENCOUNTER — TRANSCRIPTION ENCOUNTER (OUTPATIENT)
Age: 74
End: 2019-11-26

## 2019-11-26 VITALS
TEMPERATURE: 98 F | RESPIRATION RATE: 20 BRPM | HEART RATE: 60 BPM | OXYGEN SATURATION: 100 % | DIASTOLIC BLOOD PRESSURE: 65 MMHG | SYSTOLIC BLOOD PRESSURE: 97 MMHG

## 2019-11-26 LAB
ANION GAP SERPL CALC-SCNC: 14 MMOL/L — SIGNIFICANT CHANGE UP (ref 5–17)
APPEARANCE UR: CLEAR — SIGNIFICANT CHANGE UP
BACTERIA # UR AUTO: ABNORMAL
BILIRUB UR-MCNC: NEGATIVE — SIGNIFICANT CHANGE UP
BUN SERPL-MCNC: 64 MG/DL — HIGH (ref 8–20)
CALCIUM SERPL-MCNC: 9.7 MG/DL — SIGNIFICANT CHANGE UP (ref 8.6–10.2)
CHLORIDE SERPL-SCNC: 94 MMOL/L — LOW (ref 98–107)
CO2 SERPL-SCNC: 28 MMOL/L — SIGNIFICANT CHANGE UP (ref 22–29)
COLOR SPEC: YELLOW — SIGNIFICANT CHANGE UP
CREAT SERPL-MCNC: 1.95 MG/DL — HIGH (ref 0.5–1.3)
DIFF PNL FLD: NEGATIVE — SIGNIFICANT CHANGE UP
EPI CELLS # UR: SIGNIFICANT CHANGE UP
GLUCOSE BLDC GLUCOMTR-MCNC: 143 MG/DL — HIGH (ref 70–99)
GLUCOSE SERPL-MCNC: 154 MG/DL — HIGH (ref 70–115)
GLUCOSE UR QL: NEGATIVE MG/DL — SIGNIFICANT CHANGE UP
HCT VFR BLD CALC: 40.1 % — SIGNIFICANT CHANGE UP (ref 39–50)
HGB BLD-MCNC: 12.4 G/DL — LOW (ref 13–17)
KETONES UR-MCNC: NEGATIVE — SIGNIFICANT CHANGE UP
LEUKOCYTE ESTERASE UR-ACNC: NEGATIVE — SIGNIFICANT CHANGE UP
MCHC RBC-ENTMCNC: 29.9 PG — SIGNIFICANT CHANGE UP (ref 27–34)
MCHC RBC-ENTMCNC: 30.9 GM/DL — LOW (ref 32–36)
MCV RBC AUTO: 96.6 FL — SIGNIFICANT CHANGE UP (ref 80–100)
NITRITE UR-MCNC: NEGATIVE — SIGNIFICANT CHANGE UP
PH UR: 7 — SIGNIFICANT CHANGE UP (ref 5–8)
PLATELET # BLD AUTO: 176 K/UL — SIGNIFICANT CHANGE UP (ref 150–400)
POTASSIUM SERPL-MCNC: 4 MMOL/L — SIGNIFICANT CHANGE UP (ref 3.5–5.3)
POTASSIUM SERPL-SCNC: 4 MMOL/L — SIGNIFICANT CHANGE UP (ref 3.5–5.3)
PROT UR-MCNC: NEGATIVE MG/DL — SIGNIFICANT CHANGE UP
RBC # BLD: 4.15 M/UL — LOW (ref 4.2–5.8)
RBC # FLD: 16.7 % — HIGH (ref 10.3–14.5)
RBC CASTS # UR COMP ASSIST: SIGNIFICANT CHANGE UP /HPF (ref 0–4)
SODIUM SERPL-SCNC: 136 MMOL/L — SIGNIFICANT CHANGE UP (ref 135–145)
SP GR SPEC: 1 — LOW (ref 1.01–1.02)
UROBILINOGEN FLD QL: NEGATIVE MG/DL — SIGNIFICANT CHANGE UP
WBC # BLD: 3.78 K/UL — LOW (ref 3.8–10.5)
WBC # FLD AUTO: 3.78 K/UL — LOW (ref 3.8–10.5)
WBC UR QL: SIGNIFICANT CHANGE UP

## 2019-11-26 PROCEDURE — 80307 DRUG TEST PRSMV CHEM ANLYZR: CPT

## 2019-11-26 PROCEDURE — 80048 BASIC METABOLIC PNL TOTAL CA: CPT

## 2019-11-26 PROCEDURE — 71046 X-RAY EXAM CHEST 2 VIEWS: CPT

## 2019-11-26 PROCEDURE — 82962 GLUCOSE BLOOD TEST: CPT

## 2019-11-26 PROCEDURE — 83735 ASSAY OF MAGNESIUM: CPT

## 2019-11-26 PROCEDURE — 84100 ASSAY OF PHOSPHORUS: CPT

## 2019-11-26 PROCEDURE — 85610 PROTHROMBIN TIME: CPT

## 2019-11-26 PROCEDURE — 93005 ELECTROCARDIOGRAM TRACING: CPT

## 2019-11-26 PROCEDURE — 80061 LIPID PANEL: CPT

## 2019-11-26 PROCEDURE — 85730 THROMBOPLASTIN TIME PARTIAL: CPT

## 2019-11-26 PROCEDURE — 85027 COMPLETE CBC AUTOMATED: CPT

## 2019-11-26 PROCEDURE — 81001 URINALYSIS AUTO W/SCOPE: CPT

## 2019-11-26 PROCEDURE — 80053 COMPREHEN METABOLIC PANEL: CPT

## 2019-11-26 PROCEDURE — 76775 US EXAM ABDO BACK WALL LIM: CPT

## 2019-11-26 PROCEDURE — 99239 HOSP IP/OBS DSCHRG MGMT >30: CPT

## 2019-11-26 PROCEDURE — 36415 COLL VENOUS BLD VENIPUNCTURE: CPT

## 2019-11-26 PROCEDURE — 93306 TTE W/DOPPLER COMPLETE: CPT

## 2019-11-26 PROCEDURE — 93970 EXTREMITY STUDY: CPT

## 2019-11-26 PROCEDURE — 82550 ASSAY OF CK (CPK): CPT

## 2019-11-26 PROCEDURE — 82607 VITAMIN B-12: CPT

## 2019-11-26 PROCEDURE — 83880 ASSAY OF NATRIURETIC PEPTIDE: CPT

## 2019-11-26 PROCEDURE — 81003 URINALYSIS AUTO W/O SCOPE: CPT

## 2019-11-26 PROCEDURE — 99285 EMERGENCY DEPT VISIT HI MDM: CPT

## 2019-11-26 PROCEDURE — 84484 ASSAY OF TROPONIN QUANT: CPT

## 2019-11-26 PROCEDURE — 83036 HEMOGLOBIN GLYCOSYLATED A1C: CPT

## 2019-11-26 PROCEDURE — 82553 CREATINE MB FRACTION: CPT

## 2019-11-26 RX ORDER — SITAGLIPTIN 50 MG/1
1 TABLET, FILM COATED ORAL
Qty: 30 | Refills: 0
Start: 2019-11-26 | End: 2019-12-25

## 2019-11-26 RX ORDER — ATORVASTATIN CALCIUM 80 MG/1
1 TABLET, FILM COATED ORAL
Qty: 0 | Refills: 1 | DISCHARGE

## 2019-11-26 RX ORDER — METFORMIN HYDROCHLORIDE 850 MG/1
1 TABLET ORAL
Qty: 0 | Refills: 0 | DISCHARGE

## 2019-11-26 RX ORDER — ATORVASTATIN CALCIUM 80 MG/1
1 TABLET, FILM COATED ORAL
Qty: 30 | Refills: 0
Start: 2019-11-26 | End: 2019-12-25

## 2019-11-26 RX ORDER — ASPIRIN/CALCIUM CARB/MAGNESIUM 324 MG
1 TABLET ORAL
Qty: 30 | Refills: 0
Start: 2019-11-26 | End: 2019-12-25

## 2019-11-26 RX ORDER — LOSARTAN POTASSIUM 100 MG/1
1 TABLET, FILM COATED ORAL
Qty: 30 | Refills: 0
Start: 2019-11-26 | End: 2019-12-25

## 2019-11-26 RX ORDER — SPIRONOLACTONE 25 MG/1
1 TABLET, FILM COATED ORAL
Qty: 30 | Refills: 0
Start: 2019-11-26 | End: 2019-12-25

## 2019-11-26 RX ORDER — FUROSEMIDE 40 MG
1 TABLET ORAL
Qty: 30 | Refills: 0
Start: 2019-11-26 | End: 2019-12-25

## 2019-11-26 RX ORDER — CARVEDILOL PHOSPHATE 80 MG/1
1 CAPSULE, EXTENDED RELEASE ORAL
Qty: 0 | Refills: 1 | DISCHARGE

## 2019-11-26 RX ORDER — CARVEDILOL PHOSPHATE 80 MG/1
1 CAPSULE, EXTENDED RELEASE ORAL
Qty: 60 | Refills: 0
Start: 2019-11-26 | End: 2019-12-25

## 2019-11-26 RX ORDER — APIXABAN 2.5 MG/1
1 TABLET, FILM COATED ORAL
Qty: 60 | Refills: 0
Start: 2019-11-26 | End: 2019-12-25

## 2019-11-26 RX ORDER — AMIODARONE HYDROCHLORIDE 400 MG/1
1 TABLET ORAL
Qty: 30 | Refills: 0
Start: 2019-11-26 | End: 2019-12-25

## 2019-11-26 RX ORDER — GLIMEPIRIDE 1 MG
1 TABLET ORAL
Qty: 0 | Refills: 0 | DISCHARGE

## 2019-11-26 RX ORDER — APIXABAN 2.5 MG/1
1 TABLET, FILM COATED ORAL
Qty: 0 | Refills: 0 | DISCHARGE

## 2019-11-26 RX ADMIN — Medication 81 MILLIGRAM(S): at 10:26

## 2019-11-26 RX ADMIN — LOSARTAN POTASSIUM 25 MILLIGRAM(S): 100 TABLET, FILM COATED ORAL at 05:03

## 2019-11-26 RX ADMIN — APIXABAN 5 MILLIGRAM(S): 2.5 TABLET, FILM COATED ORAL at 05:03

## 2019-11-26 RX ADMIN — Medication 40 MILLIGRAM(S): at 05:03

## 2019-11-26 RX ADMIN — CARVEDILOL PHOSPHATE 3.12 MILLIGRAM(S): 80 CAPSULE, EXTENDED RELEASE ORAL at 05:03

## 2019-11-26 RX ADMIN — SPIRONOLACTONE 25 MILLIGRAM(S): 25 TABLET, FILM COATED ORAL at 05:03

## 2019-11-26 RX ADMIN — AMIODARONE HYDROCHLORIDE 200 MILLIGRAM(S): 400 TABLET ORAL at 05:03

## 2019-11-26 NOTE — DISCHARGE NOTE NURSING/CASE MANAGEMENT/SOCIAL WORK - PATIENT PORTAL LINK FT
You can access the FollowMyHealth Patient Portal offered by [facility] by registering at the following website: [email or web address]. By joining [facility]’s FollowMyHealth portal, you will also be able to view your health information using other applications (apps) compatible with our system.

## 2019-11-26 NOTE — DISCHARGE NOTE PROVIDER - CARE PROVIDER_API CALL
Zac An)  Cardiology; Internal Medicine  [address] [place] [address], [address] [id number]  Phone: [phone]  Fax: [phone]  Follow Up Time:

## 2019-11-26 NOTE — DISCHARGE NOTE PROVIDER - HOSPITAL COURSE
75 y/o male PMH Afib s/p cardioversion 11/2018, HTN, DM2, HLD, CAD s/p 3 V CABG, Mitral Valve repair, MARY LOU clip, ICM EF <20%, NYHA IIIA, BoSci ICD presents to ED with c/o several days worsening SOB, dyspnea, edema. Pt non compliant with medications        Treated for acute on chronic HFrEF with improvement in edema/resp status. AICD interrogated no VT, only afib.      Seen by cardiology, meds optimized and advised outpatient follow up.    Noted to have DEV, likely from overdiuresis, thus diuretics to be held 2 days and labs rechecked in 1 week.      daughter updated with above and agreeable.        dc home in stable condition.    dc planning 35 minutes. d/w Dr Macario.

## 2019-11-26 NOTE — DISCHARGE NOTE PROVIDER - NSDCMRMEDTOKEN_GEN_ALL_CORE_FT
Aldactone 25 mg oral tablet: 1 tab(s) orally once a day     start on 11/28  amiodarone 200 mg oral tablet: 1 tab(s) orally once a day  aspirin 81 mg oral delayed release tablet: 1 tab(s) orally once a day  atorvastatin 20 mg oral tablet: 1 tab(s) orally once a day  BMP : fax results to Dr Macario    perform in 1 week.   carvedilol 3.125 mg oral tablet: 1 tab(s) orally every 12 hours  Eliquis 5 mg oral tablet: 1 tab(s) orally 2 times a day  furosemide 40 mg oral tablet: 1 tab(s) orally once a day    start 11/28  Januvia 50 mg oral tablet: 1 tab(s) orally once a day   losartan 25 mg oral tablet: 1 tab(s) orally once a day     start on 11/28

## 2019-11-26 NOTE — DISCHARGE NOTE PROVIDER - NSDCCPCAREPLAN_GEN_ALL_CORE_FT
PRINCIPAL DISCHARGE DIAGNOSIS  Diagnosis: Acute on chronic systolic heart failure  Assessment and Plan of Treatment: take medications as prescribed  follow up with cardiology in 1 week.  perform blood tests prior to visit.  monitor weight daily.        SECONDARY DISCHARGE DIAGNOSES  Diagnosis: Type 2 diabetes mellitus  Assessment and Plan of Treatment: hold metformin. continue januvia.      Diagnosis: AF (atrial fibrillation)  Assessment and Plan of Treatment:     Diagnosis: Acute kidney injury  Assessment and Plan of Treatment: likely from diuretics.   restart lasix, spiranolactone and losartan on 11/28

## 2019-12-17 RX ORDER — MUPIROCIN 2 G/100G
2 CREAM TOPICAL TWICE DAILY
Qty: 1 | Refills: 0 | Status: DISCONTINUED | COMMUNITY
Start: 2017-01-25 | End: 2019-12-17

## 2019-12-17 RX ORDER — ASPIRIN 81 MG
81 TABLET, DELAYED RELEASE (ENTERIC COATED) ORAL
Refills: 0 | Status: DISCONTINUED | COMMUNITY
End: 2019-12-17

## 2019-12-17 RX ORDER — CARVEDILOL 12.5 MG/1
12.5 TABLET, FILM COATED ORAL
Qty: 90 | Refills: 1 | Status: DISCONTINUED | COMMUNITY
Start: 2017-12-15 | End: 2019-12-17

## 2019-12-17 RX ORDER — BLOOD-GLUCOSE METER
70 EACH MISCELLANEOUS
Refills: 0 | Status: COMPLETED | COMMUNITY
Start: 2016-12-30 | End: 2019-12-17

## 2019-12-17 RX ORDER — SACUBITRIL AND VALSARTAN 49; 51 MG/1; MG/1
49-51 TABLET, FILM COATED ORAL
Qty: 60 | Refills: 5 | Status: DISCONTINUED | COMMUNITY
Start: 2017-03-21 | End: 2019-12-17

## 2019-12-17 RX ORDER — LANCETS
EACH MISCELLANEOUS
Refills: 0 | Status: COMPLETED | COMMUNITY
Start: 2016-12-30 | End: 2019-12-17

## 2019-12-17 RX ORDER — BLOOD SUGAR DIAGNOSTIC
STRIP MISCELLANEOUS
Refills: 0 | Status: COMPLETED | COMMUNITY
Start: 2016-12-30 | End: 2019-12-17

## 2019-12-17 RX ORDER — DIGOXIN 125 UG/1
125 TABLET ORAL
Qty: 90 | Refills: 1 | Status: DISCONTINUED | COMMUNITY
Start: 2018-06-18 | End: 2019-12-17

## 2019-12-17 RX ORDER — ESCITALOPRAM OXALATE 10 MG/1
10 TABLET ORAL DAILY
Qty: 90 | Refills: 1 | Status: DISCONTINUED | COMMUNITY
Start: 2017-01-11 | End: 2019-12-17

## 2019-12-17 RX ORDER — SITAGLIPTIN 50 MG/1
50 TABLET, FILM COATED ORAL DAILY
Refills: 0 | Status: ACTIVE | COMMUNITY
Start: 2019-12-17

## 2019-12-17 RX ORDER — SPIRONOLACTONE 25 MG/1
25 TABLET ORAL TWICE DAILY
Qty: 180 | Refills: 0 | Status: DISCONTINUED | COMMUNITY
Start: 2017-03-21 | End: 2019-12-17

## 2019-12-17 RX ORDER — PANTOPRAZOLE 40 MG/1
40 TABLET, DELAYED RELEASE ORAL
Qty: 14 | Refills: 0 | Status: DISCONTINUED | COMMUNITY
Start: 2016-12-30 | End: 2019-12-17

## 2019-12-17 RX ORDER — PANTOPRAZOLE 20 MG/1
20 TABLET, DELAYED RELEASE ORAL
Qty: 30 | Refills: 0 | Status: DISCONTINUED | COMMUNITY
Start: 2017-01-25 | End: 2019-12-17

## 2019-12-19 ENCOUNTER — MEDICATION RENEWAL (OUTPATIENT)
Age: 74
End: 2019-12-19

## 2019-12-31 DIAGNOSIS — Z86.39 PERSONAL HISTORY OF OTHER ENDOCRINE, NUTRITIONAL AND METABOLIC DISEASE: ICD-10-CM

## 2019-12-31 DIAGNOSIS — R06.02 SHORTNESS OF BREATH: ICD-10-CM

## 2019-12-31 DIAGNOSIS — Z86.79 PERSONAL HISTORY OF OTHER DISEASES OF THE CIRCULATORY SYSTEM: ICD-10-CM

## 2019-12-31 DIAGNOSIS — R60.9 EDEMA, UNSPECIFIED: ICD-10-CM

## 2019-12-31 DIAGNOSIS — N17.9 ACUTE KIDNEY FAILURE, UNSPECIFIED: ICD-10-CM

## 2020-01-09 ENCOUNTER — APPOINTMENT (OUTPATIENT)
Dept: CARDIOLOGY | Facility: CLINIC | Age: 75
End: 2020-01-09
Payer: MEDICARE

## 2020-01-09 ENCOUNTER — NON-APPOINTMENT (OUTPATIENT)
Age: 75
End: 2020-01-09

## 2020-01-09 VITALS
HEIGHT: 66 IN | HEART RATE: 60 BPM | OXYGEN SATURATION: 99 % | WEIGHT: 164 LBS | SYSTOLIC BLOOD PRESSURE: 119 MMHG | DIASTOLIC BLOOD PRESSURE: 64 MMHG | BODY MASS INDEX: 26.36 KG/M2

## 2020-01-09 DIAGNOSIS — I50.22 CHRONIC SYSTOLIC (CONGESTIVE) HEART FAILURE: ICD-10-CM

## 2020-01-09 DIAGNOSIS — I25.10 ATHEROSCLEROTIC HEART DISEASE OF NATIVE CORONARY ARTERY W/OUT ANGINA PECTORIS: ICD-10-CM

## 2020-01-09 DIAGNOSIS — I48.91 UNSPECIFIED ATRIAL FIBRILLATION: ICD-10-CM

## 2020-01-09 PROCEDURE — 93000 ELECTROCARDIOGRAM COMPLETE: CPT

## 2020-01-09 PROCEDURE — 99215 OFFICE O/P EST HI 40 MIN: CPT | Mod: 25

## 2020-01-09 RX ORDER — FUROSEMIDE 40 MG/1
40 TABLET ORAL DAILY
Qty: 90 | Refills: 1 | Status: ACTIVE | COMMUNITY
Start: 1900-01-01 | End: 1900-01-01

## 2020-01-09 RX ORDER — CARVEDILOL 3.12 MG/1
3.12 TABLET, FILM COATED ORAL
Qty: 180 | Refills: 1 | Status: ACTIVE | COMMUNITY
Start: 2019-12-17 | End: 1900-01-01

## 2020-01-09 RX ORDER — AMIODARONE HYDROCHLORIDE 200 MG/1
200 TABLET ORAL
Qty: 90 | Refills: 1 | Status: ACTIVE | COMMUNITY
Start: 2016-12-30 | End: 1900-01-01

## 2020-01-09 RX ORDER — APIXABAN 5 MG/1
5 TABLET, FILM COATED ORAL
Qty: 180 | Refills: 1 | Status: ACTIVE | COMMUNITY
Start: 2018-10-16 | End: 1900-01-01

## 2020-01-09 RX ORDER — SPIRONOLACTONE 25 MG/1
25 TABLET ORAL DAILY
Qty: 90 | Refills: 0 | Status: DISCONTINUED | COMMUNITY
Start: 2019-12-17 | End: 2020-01-09

## 2020-03-10 ENCOUNTER — APPOINTMENT (OUTPATIENT)
Dept: CARDIOLOGY | Facility: CLINIC | Age: 75
End: 2020-03-10

## 2020-03-11 ENCOUNTER — RX CHANGE (OUTPATIENT)
Age: 75
End: 2020-03-11

## 2020-03-15 RX ORDER — CANDESARTAN CILEXETIL 4 MG/1
4 TABLET ORAL DAILY
Qty: 90 | Refills: 1 | Status: ACTIVE | COMMUNITY
Start: 2020-03-15 | End: 1900-01-01

## 2021-05-25 NOTE — H&P PST ADULT - NSSUBSTANCEUSE_GEN_ALL_CORE_SD
Patient left without being seen before RN triage.  I did not examine this patient           Leonardo Shah PA-C  05/25/21 [id number] never used

## 2023-02-18 NOTE — PROGRESS NOTE ADULT - PROBLEM SELECTOR PROBLEM 4
Patient with one or more new problems requiring additional work-up/treatment.
Atrial fibrillation, unspecified type